# Patient Record
Sex: FEMALE | Race: WHITE | HISPANIC OR LATINO
[De-identification: names, ages, dates, MRNs, and addresses within clinical notes are randomized per-mention and may not be internally consistent; named-entity substitution may affect disease eponyms.]

---

## 2017-04-03 ENCOUNTER — APPOINTMENT (OUTPATIENT)
Dept: INTERNAL MEDICINE | Facility: CLINIC | Age: 32
End: 2017-04-03

## 2017-04-03 VITALS — DIASTOLIC BLOOD PRESSURE: 68 MMHG | SYSTOLIC BLOOD PRESSURE: 100 MMHG | RESPIRATION RATE: 12 BRPM | HEART RATE: 60 BPM

## 2017-04-03 VITALS — HEIGHT: 62 IN | BODY MASS INDEX: 38.83 KG/M2 | WEIGHT: 211 LBS

## 2017-04-03 DIAGNOSIS — M75.02 ADHESIVE CAPSULITIS OF LEFT SHOULDER: ICD-10-CM

## 2017-04-21 ENCOUNTER — TRANSCRIPTION ENCOUNTER (OUTPATIENT)
Age: 32
End: 2017-04-21

## 2017-06-16 ENCOUNTER — RX RENEWAL (OUTPATIENT)
Age: 32
End: 2017-06-16

## 2017-06-21 ENCOUNTER — RX RENEWAL (OUTPATIENT)
Age: 32
End: 2017-06-21

## 2017-07-01 ENCOUNTER — TRANSCRIPTION ENCOUNTER (OUTPATIENT)
Age: 32
End: 2017-07-01

## 2017-10-06 ENCOUNTER — TRANSCRIPTION ENCOUNTER (OUTPATIENT)
Age: 32
End: 2017-10-06

## 2018-04-24 ENCOUNTER — TRANSCRIPTION ENCOUNTER (OUTPATIENT)
Age: 33
End: 2018-04-24

## 2018-07-16 ENCOUNTER — APPOINTMENT (OUTPATIENT)
Dept: INTERNAL MEDICINE | Facility: CLINIC | Age: 33
End: 2018-07-16

## 2019-05-24 ENCOUNTER — EMERGENCY (EMERGENCY)
Facility: HOSPITAL | Age: 34
LOS: 1 days | Discharge: HOME | End: 2019-05-24
Attending: EMERGENCY MEDICINE
Payer: COMMERCIAL

## 2019-05-24 VITALS
HEIGHT: 63 IN | WEIGHT: 220.02 LBS | DIASTOLIC BLOOD PRESSURE: 98 MMHG | SYSTOLIC BLOOD PRESSURE: 164 MMHG | RESPIRATION RATE: 18 BRPM | OXYGEN SATURATION: 98 % | TEMPERATURE: 99 F | HEART RATE: 74 BPM

## 2019-05-24 VITALS
TEMPERATURE: 98 F | DIASTOLIC BLOOD PRESSURE: 78 MMHG | HEART RATE: 72 BPM | SYSTOLIC BLOOD PRESSURE: 141 MMHG | RESPIRATION RATE: 18 BRPM | OXYGEN SATURATION: 98 %

## 2019-05-24 LAB
ALBUMIN SERPL ELPH-MCNC: 4.7 G/DL — SIGNIFICANT CHANGE UP (ref 3.3–5.2)
ALP SERPL-CCNC: 203 U/L — HIGH (ref 40–120)
ALT FLD-CCNC: 146 U/L — HIGH
ANION GAP SERPL CALC-SCNC: 16 MMOL/L — SIGNIFICANT CHANGE UP (ref 5–17)
AST SERPL-CCNC: 228 U/L — HIGH
BILIRUB SERPL-MCNC: 1.3 MG/DL — SIGNIFICANT CHANGE UP (ref 0.4–2)
BUN SERPL-MCNC: 7 MG/DL — LOW (ref 8–20)
CALCIUM SERPL-MCNC: 10.3 MG/DL — HIGH (ref 8.6–10.2)
CHLORIDE SERPL-SCNC: 91 MMOL/L — LOW (ref 98–107)
CO2 SERPL-SCNC: 29 MMOL/L — SIGNIFICANT CHANGE UP (ref 22–29)
CREAT SERPL-MCNC: 0.49 MG/DL — LOW (ref 0.5–1.3)
D DIMER BLD IA.RAPID-MCNC: 336 NG/ML DDU — HIGH
ETHANOL SERPL-MCNC: <10 MG/DL — SIGNIFICANT CHANGE UP
GLUCOSE SERPL-MCNC: 104 MG/DL — SIGNIFICANT CHANGE UP (ref 70–115)
HCG SERPL-ACNC: <4 MIU/ML — SIGNIFICANT CHANGE UP
HCT VFR BLD CALC: 42.2 % — SIGNIFICANT CHANGE UP (ref 37–47)
HGB BLD-MCNC: 14.6 G/DL — SIGNIFICANT CHANGE UP (ref 12–16)
MAGNESIUM SERPL-MCNC: 2.1 MG/DL — SIGNIFICANT CHANGE UP (ref 1.6–2.6)
MCHC RBC-ENTMCNC: 31 PG — SIGNIFICANT CHANGE UP (ref 27–31)
MCHC RBC-ENTMCNC: 34.6 G/DL — SIGNIFICANT CHANGE UP (ref 32–36)
MCV RBC AUTO: 89.6 FL — SIGNIFICANT CHANGE UP (ref 81–99)
PLATELET # BLD AUTO: 138 K/UL — LOW (ref 150–400)
POTASSIUM SERPL-MCNC: 3 MMOL/L — LOW (ref 3.5–5.3)
POTASSIUM SERPL-SCNC: 3 MMOL/L — LOW (ref 3.5–5.3)
PROT SERPL-MCNC: 7.8 G/DL — SIGNIFICANT CHANGE UP (ref 6.6–8.7)
RBC # BLD: 4.71 M/UL — SIGNIFICANT CHANGE UP (ref 4.4–5.2)
RBC # FLD: 15.3 % — SIGNIFICANT CHANGE UP (ref 11–15.6)
SODIUM SERPL-SCNC: 136 MMOL/L — SIGNIFICANT CHANGE UP (ref 135–145)
TROPONIN T SERPL-MCNC: <0.01 NG/ML — SIGNIFICANT CHANGE UP (ref 0–0.06)
WBC # BLD: 4.1 K/UL — LOW (ref 4.8–10.8)
WBC # FLD AUTO: 4.1 K/UL — LOW (ref 4.8–10.8)

## 2019-05-24 PROCEDURE — 93005 ELECTROCARDIOGRAM TRACING: CPT

## 2019-05-24 PROCEDURE — 80053 COMPREHEN METABOLIC PANEL: CPT

## 2019-05-24 PROCEDURE — 36415 COLL VENOUS BLD VENIPUNCTURE: CPT

## 2019-05-24 PROCEDURE — 71275 CT ANGIOGRAPHY CHEST: CPT | Mod: 26

## 2019-05-24 PROCEDURE — 99285 EMERGENCY DEPT VISIT HI MDM: CPT

## 2019-05-24 PROCEDURE — 85379 FIBRIN DEGRADATION QUANT: CPT

## 2019-05-24 PROCEDURE — 84484 ASSAY OF TROPONIN QUANT: CPT

## 2019-05-24 PROCEDURE — 84702 CHORIONIC GONADOTROPIN TEST: CPT

## 2019-05-24 PROCEDURE — 71046 X-RAY EXAM CHEST 2 VIEWS: CPT

## 2019-05-24 PROCEDURE — 85027 COMPLETE CBC AUTOMATED: CPT

## 2019-05-24 PROCEDURE — 83735 ASSAY OF MAGNESIUM: CPT

## 2019-05-24 PROCEDURE — 80307 DRUG TEST PRSMV CHEM ANLYZR: CPT

## 2019-05-24 PROCEDURE — 93010 ELECTROCARDIOGRAM REPORT: CPT

## 2019-05-24 PROCEDURE — 99284 EMERGENCY DEPT VISIT MOD MDM: CPT

## 2019-05-24 PROCEDURE — 71275 CT ANGIOGRAPHY CHEST: CPT

## 2019-05-24 PROCEDURE — 71046 X-RAY EXAM CHEST 2 VIEWS: CPT | Mod: 26

## 2019-05-24 RX ORDER — SODIUM CHLORIDE 9 MG/ML
1000 INJECTION INTRAMUSCULAR; INTRAVENOUS; SUBCUTANEOUS ONCE
Refills: 0 | Status: COMPLETED | OUTPATIENT
Start: 2019-05-24 | End: 2019-05-24

## 2019-05-24 RX ORDER — POTASSIUM CHLORIDE 20 MEQ
1 PACKET (EA) ORAL
Qty: 6 | Refills: 0
Start: 2019-05-24 | End: 2019-05-26

## 2019-05-24 RX ORDER — POTASSIUM CHLORIDE 20 MEQ
40 PACKET (EA) ORAL ONCE
Refills: 0 | Status: COMPLETED | OUTPATIENT
Start: 2019-05-24 | End: 2019-05-24

## 2019-05-24 RX ADMIN — Medication 40 MILLIEQUIVALENT(S): at 22:32

## 2019-05-24 RX ADMIN — Medication 1 MILLIGRAM(S): at 14:11

## 2019-05-24 RX ADMIN — SODIUM CHLORIDE 1000 MILLILITER(S): 9 INJECTION INTRAMUSCULAR; INTRAVENOUS; SUBCUTANEOUS at 14:12

## 2019-05-24 NOTE — ED PROVIDER NOTE - CLINICAL SUMMARY MEDICAL DECISION MAKING FREE TEXT BOX
chest burning, sob, mild tachycardia with palpitations. no other PE risk factors, low risk but given tachycardia, dimer, eleavted, so will do cta. otherwise well. no tremors, no sign etoh withdrawal. pt with anxiety and stressors (no si/hi). hydration, ativan, cta, reassess.

## 2019-05-24 NOTE — ED PROVIDER NOTE - CARE PLAN
Principal Discharge DX:	Palpitations Principal Discharge DX:	Palpitations  Secondary Diagnosis:	Abnormal LFTs (liver function tests) Principal Discharge DX:	Palpitations  Secondary Diagnosis:	Abnormal LFTs (liver function tests)  Secondary Diagnosis:	Hypokalemia

## 2019-05-24 NOTE — ED PROVIDER NOTE - PROGRESS NOTE DETAILS
CTA results as noted.  Pt stable for d/c CTA results as noted.  Pt advised of abnormal labs including hypokalemia and LFT and instructed to f/u PMD as outpt.  Rx K-dur.  Pt is stable for d/c

## 2019-05-24 NOTE — ED ADULT TRIAGE NOTE - CHIEF COMPLAINT QUOTE
Patient arrived via EMS, awake alert, and oriented times 3, breathing unlabored.  Patient complaining of chest palpitations, and chest tightness which has been present intermittent for 3 days.  patient denies any drugs or alcohol., however EMS stated mother states drinks every day and has no drank in a few days

## 2019-05-24 NOTE — ED PROVIDER NOTE - OBJECTIVE STATEMENT
35 y/o female no pmh c/o 3 days of chest burning with palpitations. States feels anxious and has been under more stress with mother dying this year. States occasional etoh, not daily, no withdrawals and denies significant, none recently per pt, no other drugs. Denies hx dvt/pe, no leg pain/swelling, no immobilization, no known cardiac hx in family. No radiation or exertional component. Minimal sob    ROS: No fever/chills. No eye pain/changes in vision, No ear pain/sore throat/dysphagia, No   No abdominal pain, N/V/D, no black/bloody bm. No dysuria/frequency/discharge, No headache. No Dizziness.    No rashes or breaks in skin. No numbness/tingling/weakness.

## 2019-05-24 NOTE — ED ADULT NURSE NOTE - OBJECTIVE STATEMENT
Patient presents to ER complaining of substernal chest pain, denies N/V, reports symptoms X 3 days, patient anxious, resp even/unlabored, lungs CTAB.

## 2019-05-24 NOTE — ED ADULT NURSE NOTE - NSIMPLEMENTINTERV_GEN_ALL_ED
Implemented All Universal Safety Interventions:  [place] to call system. Call bell, personal items and telephone within reach. Instruct patient to call for assistance. Room bathroom lighting operational. Non-slip footwear when patient is off stretcher. Physically safe environment: no spills, clutter or unnecessary equipment. Stretcher in lowest position, wheels locked, appropriate side rails in place.

## 2019-05-24 NOTE — ED PROVIDER NOTE - PHYSICAL EXAMINATION
Gen: No acute distress, non toxic  HEENT: Mucous membranes moist, pink conjunctivae, EOMI  CV: pulse ~100-110, nl s1/s2.  Resp: CTAB, normal rate and effort  GI: Abdomen soft, NT, ND. No rebound, no guarding  : No CVAT  Neuro: A&O x 3, moving all 4 extremities  MSK: No spine or joint tenderness to palpation  Skin: No rashes. intact and perfused.

## 2019-05-25 ENCOUNTER — EMERGENCY (EMERGENCY)
Facility: HOSPITAL | Age: 34
LOS: 1 days | Discharge: HOME | End: 2019-05-25
Attending: EMERGENCY MEDICINE
Payer: COMMERCIAL

## 2019-05-25 VITALS
WEIGHT: 210.1 LBS | HEIGHT: 64 IN | SYSTOLIC BLOOD PRESSURE: 163 MMHG | HEART RATE: 90 BPM | TEMPERATURE: 99 F | OXYGEN SATURATION: 99 % | DIASTOLIC BLOOD PRESSURE: 103 MMHG | RESPIRATION RATE: 20 BRPM

## 2019-05-25 VITALS
HEART RATE: 112 BPM | HEIGHT: 63 IN | OXYGEN SATURATION: 98 % | DIASTOLIC BLOOD PRESSURE: 98 MMHG | SYSTOLIC BLOOD PRESSURE: 157 MMHG | TEMPERATURE: 98 F | RESPIRATION RATE: 20 BRPM | WEIGHT: 220.02 LBS

## 2019-05-25 VITALS
DIASTOLIC BLOOD PRESSURE: 104 MMHG | HEART RATE: 110 BPM | TEMPERATURE: 99 F | RESPIRATION RATE: 16 BRPM | SYSTOLIC BLOOD PRESSURE: 137 MMHG | OXYGEN SATURATION: 100 %

## 2019-05-25 DIAGNOSIS — F10.230 ALCOHOL DEPENDENCE WITH WITHDRAWAL, UNCOMPLICATED: ICD-10-CM

## 2019-05-25 DIAGNOSIS — F43.22 ADJUSTMENT DISORDER WITH ANXIETY: ICD-10-CM

## 2019-05-25 DIAGNOSIS — R69 ILLNESS, UNSPECIFIED: ICD-10-CM

## 2019-05-25 LAB
AMPHET UR-MCNC: NEGATIVE — SIGNIFICANT CHANGE UP
APPEARANCE UR: CLEAR — SIGNIFICANT CHANGE UP
BARBITURATES UR SCN-MCNC: NEGATIVE — SIGNIFICANT CHANGE UP
BENZODIAZ UR-MCNC: NEGATIVE — SIGNIFICANT CHANGE UP
BILIRUB UR-MCNC: NEGATIVE — SIGNIFICANT CHANGE UP
COCAINE METAB.OTHER UR-MCNC: NEGATIVE — SIGNIFICANT CHANGE UP
COLOR SPEC: YELLOW — SIGNIFICANT CHANGE UP
DIFF PNL FLD: ABNORMAL
EPI CELLS # UR: SIGNIFICANT CHANGE UP
GLUCOSE UR QL: NEGATIVE MG/DL — SIGNIFICANT CHANGE UP
HCG UR QL: NEGATIVE — SIGNIFICANT CHANGE UP
KETONES UR-MCNC: NEGATIVE — SIGNIFICANT CHANGE UP
LEUKOCYTE ESTERASE UR-ACNC: NEGATIVE — SIGNIFICANT CHANGE UP
METHADONE UR-MCNC: NEGATIVE — SIGNIFICANT CHANGE UP
NITRITE UR-MCNC: NEGATIVE — SIGNIFICANT CHANGE UP
OPIATES UR-MCNC: NEGATIVE — SIGNIFICANT CHANGE UP
PCP SPEC-MCNC: SIGNIFICANT CHANGE UP
PCP UR-MCNC: NEGATIVE — SIGNIFICANT CHANGE UP
PH UR: 7 — SIGNIFICANT CHANGE UP (ref 5–8)
PROT UR-MCNC: NEGATIVE MG/DL — SIGNIFICANT CHANGE UP
RBC CASTS # UR COMP ASSIST: SIGNIFICANT CHANGE UP /HPF (ref 0–4)
SP GR SPEC: 1 — LOW (ref 1.01–1.02)
THC UR QL: NEGATIVE — SIGNIFICANT CHANGE UP
UROBILINOGEN FLD QL: NEGATIVE MG/DL — SIGNIFICANT CHANGE UP
WBC UR QL: NEGATIVE — SIGNIFICANT CHANGE UP

## 2019-05-25 PROCEDURE — 99284 EMERGENCY DEPT VISIT MOD MDM: CPT

## 2019-05-25 PROCEDURE — 80307 DRUG TEST PRSMV CHEM ANLYZR: CPT

## 2019-05-25 PROCEDURE — 99282 EMERGENCY DEPT VISIT SF MDM: CPT

## 2019-05-25 PROCEDURE — 90791 PSYCH DIAGNOSTIC EVALUATION: CPT

## 2019-05-25 PROCEDURE — 81001 URINALYSIS AUTO W/SCOPE: CPT

## 2019-05-25 PROCEDURE — 99283 EMERGENCY DEPT VISIT LOW MDM: CPT

## 2019-05-25 PROCEDURE — 81025 URINE PREGNANCY TEST: CPT

## 2019-05-25 RX ORDER — MECLIZINE HCL 12.5 MG
1 TABLET ORAL
Qty: 15 | Refills: 0
Start: 2019-05-25

## 2019-05-25 RX ADMIN — Medication 50 MILLIGRAM(S): at 19:06

## 2019-05-25 RX ADMIN — Medication 50 MILLIGRAM(S): at 23:39

## 2019-05-25 NOTE — ED ADULT NURSE NOTE - ORIENTED TO SITUATION
Pt asking to speak with robin Rincon stgino Gifford is only available on Tuesday and Thursday.          Yes

## 2019-05-25 NOTE — ED PROVIDER NOTE - PROGRESS NOTE DETAILS
Pt. seen and cleared by psych. Pt. in no distress. NO tremors. NO confusion. Normal and steady gait. Pt. also given outpatient referral. Pt. slightly hypertensive and will discharged home on librium to prevent DTs and or seizures.

## 2019-05-25 NOTE — ED ADULT NURSE REASSESSMENT NOTE - NS ED NURSE REASSESS COMMENT FT1
Upon return from rendering care to critical pt, pt was unable to be located, MD Zhou aware, psych previously cleared pt, pt had previously denied SI/HI and was acting calm and cooperative, pt did not wait for D/C instructions, follow up care, and papers

## 2019-05-25 NOTE — ED BEHAVIORAL HEALTH NOTE - BEHAVIORAL HEALTH NOTE
Miryam: pt seen by psych NP(Salvdaor) found to benefit from outpatient psychotherapy . [facility] explained agreed to attend. schedule reviewed, release form signed as per protocol. Appt given for Tuesday June4th at 16:30. Aware to call 911 and/or to go to the closest ED . No other concerns reported at this moment.

## 2019-05-25 NOTE — ED ADULT NURSE NOTE - OBJECTIVE STATEMENT
Pt brought in by family for visual and auditory halluciantions, pt went on 3 week drinking binge after death of her mother, last drink 2 days ago, denies SI/HI

## 2019-05-25 NOTE — ED STATDOCS - PHYSICAL EXAMINATION
Gen: No acute distress, non toxic  HEENT: Mucous membranes moist, pink conjunctivae, EOMI. TM's within normal limits.   CV: RRR  Resp: CTAB, normal rate and effort  GI: Abdomen soft, NT, ND. No rebound, no guarding  Neuro: A&O x 3, moving all 4 extremities, normal sensation and strength to UE PATY.

## 2019-05-25 NOTE — ED BEHAVIORAL HEALTH ASSESSMENT NOTE - DETAILS
7 y/o. Spoke with cousin (Le) about the plan. Spoke to Le about the plan Dr. Donato Spoke to Patient' cousin about the plan.

## 2019-05-25 NOTE — ED BEHAVIORAL HEALTH ASSESSMENT NOTE - OTHER
Patient advised to go to AA meetings Grieving; mother  last month. Will benefit from outpatient psychiatric referral for therapy. Psychotropic meds are not indicated at this time.

## 2019-05-25 NOTE — ED BEHAVIORAL HEALTH ASSESSMENT NOTE - HPI (INCLUDE ILLNESS QUALITY, SEVERITY, DURATION, TIMING, CONTEXT, MODIFYING FACTORS, ASSOCIATED SIGNS AND SYMPTOMS)
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 Without any past medical history; without any past psychiatric history; without any past psychiatric hospitalizations; with a history of alcohol abuse since age 20.  With one prior arrest for a DWI charge in 2016. No history of illicit drug use reported. Psychiatry was consulted per family' request.    Met with patient in ED for a psychiatric assessment. Patient is calm, cooperative and pleasant. States "my family got scared because I killed a rat, and they couldn't see the rat; my family is overprotective". Patient states her mother   on 19, and that she has been drinking on and off since mother ; however, for the past week she reports drinking one bottle of wine daily. She states she has stopped drinking 3 days ago.  No current tremors noted or reported. She received librium 50mg PO X1 dose in the ED and reported feeling better. She denies any history of illicit drug use. She denies any history of childhood trauma, but was adopted as a child, noting her mother went to detention for over 10 years when she was just 5 years old. She denies any history of psychiatric treatment or psychiatric hospitalizations; denies any history of suicidal attempts, and denies current suicidal ideations, suicidal intent or suicidal plans, citing her daughter as protective factor. She denies any history of psychosis, juliane and no current symptoms of delusions, paranoia, hallucinations reported or noted. She states she only had one episode of visual hallucination today by seeing a rat. However, she denies current hallucinations, delusions, paranoia, ideas of reference and none noted. She states she is now more accepting of her mother' death, noting "I have to accept that she  since I can't change what happened". Patient states she lives with her cousin Le and her aunt in . She states they are very supportive of her. She states her sleep is "on and off", and that she wakes up early to get ready for work. She states she works from Monday to Friday from 8:30AM until 5:01PM. She states she likes her job and talks to different people each day. 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 She states she plans on taking the test in the future.     Per collateral from her cousin Le who requested for psych eval, she states she was concerned because her cousin told her she was seeing a rat when there was no rat there. She states she was concerned because her cousin had been drinking a lot then stopped on Wednesday. 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 Without any past medical history; without any past psychiatric history; without any past psychiatric hospitalizations; with a history of alcohol abuse since age 20.  With one prior arrest for a DWI charge in 2016. No history of illicit drug use reported. Of note patient was see in the ED yesterday 19 for palpitation and buzzing in her ear. She was treated and released and returned today with symptoms consistent with alcohol withdrawals. Psychiatry was consulted per family' request.     Met with patient in ED for a psychiatric assessment. Patient is calm, cooperative and pleasant. States "my family got scared because I killed a rat, and they couldn't see the rat; my family is overprotective". Patient states her mother   on 19, and that she has been drinking on and off since mother ; however, for the past week she reports drinking one bottle of wine daily. She states she has stopped drinking 3 days ago.  No current tremors noted or reported. She received librium 50mg PO X1 dose in the ED and reported feeling better. She denies any history of illicit drug use. She denies any history of childhood trauma, but was adopted as a child, noting her mother went to group home for over 10 years when she was just 5 years old. She denies any history of psychiatric treatment or psychiatric hospitalizations; denies any history of suicidal attempts, and denies current suicidal ideations, suicidal intent or suicidal plans, citing her daughter as protective factor. She denies any history of psychosis, juliane and no current symptoms of delusions, paranoia, hallucinations reported or noted. She states she only had one episode of visual hallucination today by seeing a rat. However, she denies current hallucinations, delusions, paranoia, ideas of reference and none noted. She states she is now more accepting of her mother' death, noting "I have to accept that she  since I can't change what happened". Patient states she lives with her cousin Le and her aunt in . She states they are very supportive of her. She states her sleep is "on and off", and that she wakes up early to get ready for work. She states she works from Monday to Friday from 8:30AM until 5:01PM. She states she likes her job and talks to different people each day. 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 She states she plans on taking the test in the future.     Per collateral from her cousin Le who requested for psych eval, she states she was concerned because her cousin told her she was seeing a rat when there was no rat there. She states she was concerned because her cousin had been drinking a lot then stopped on Wednesday.

## 2019-05-25 NOTE — ED BEHAVIORAL HEALTH ASSESSMENT NOTE - RISK ASSESSMENT
LOW RISK:  Patient is at very low risk of harm to self or others given lack of current psychiatric symptomatology. LOW RISK:  Patient is at very low risk of harm to self or others given lack of current psychiatric symptomatology. She has no history of past suicidal attempts, psychosis, juliane and none acute psychiatric symptoms noted at this time.

## 2019-05-25 NOTE — ED BEHAVIORAL HEALTH ASSESSMENT NOTE - PSYCHIATRIC ISSUES AND PLAN (INCLUDE STANDING AND PRN MEDICATION)
Grieving; mother  last month. Will benefit from outpatient psychiatric referral for therapy. Psychotropic meds are not indicated at this time.

## 2019-05-25 NOTE — ED BEHAVIORAL HEALTH ASSESSMENT NOTE - SUICIDE PROTECTIVE FACTORS
Responsibility to family and others/High spirituality/Positive therapeutic relationships/Supportive social network or family/Engaged in work or school/Identifies reasons for living/Future oriented

## 2019-05-25 NOTE — ED BEHAVIORAL HEALTH ASSESSMENT NOTE - SUMMARY
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 Without any past medical history; without any past psychiatric history; without any past psychiatric hospitalizations; with a history of alcohol abuse since age 20.  With one prior arrest for a DWI charge in 2016. No history of illicit drug use reported. Psychiatry was consulted per family' request.      Patient is calm and cooperative; denying current acute psychiatric symptomatology and none noted. She was offered PRN medication for anxiety; however, she adamantly refused medication, noting she does not like to take medications. Nonetheless, she is calm, pleasant, well-related throughout this assessment; no signs of perceptual disturbances noted or reported. She is not an acute risk to self or others and will not benefit from impatient psychiatric treatment; however, she was given an outpatient psychiatric referral for [facility] for 6/4/19 at 4:30pm.   Case discussed with Dr. 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 Without any past medical history; without any past psychiatric history; without any past psychiatric hospitalizations; with a history of alcohol abuse since age 20.  With one prior arrest for a DWI charge in 2016. No history of illicit drug use reported. Psychiatry was consulted per family' request.      Patient is calm and cooperative; denying current acute psychiatric symptomatology and none noted. She was offered PRN medication for anxiety; however, she adamantly refused medication, noting she does not like to take medications. Nonetheless, she is calm, pleasant, well-related throughout this assessment; no signs of perceptual disturbances noted or reported. She is not an acute risk to self or others and will not benefit from impatient psychiatric treatment; however, she has current signs of alcohol withdrawals including an elevated BP, and will benefit from staying overnight for management of alcohol withdrawals. She was seen by social work and is scheduled for an outpatient psychiatric referral for [facility] for 6/4/19 at 4:30pm.   Case discussed with Dr. 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 Without any past medical history; without any past psychiatric history; without any past psychiatric hospitalizations; with a history of alcohol abuse since age 20.  With one prior arrest for a DWI charge in 2016. No history of illicit drug use reported. Psychiatry was consulted per family' request.      Patient is calm and cooperative; denying current acute psychiatric symptomatology and none noted. She was offered PRN medication for anxiety to take at home; however, she adamantly refused medication, noting she does not like to take medications. However, she  did receive Librium 50 mg X dose while in the ED. Nonetheless, she is calm, pleasant, well-related throughout this assessment; no signs of perceptual disturbances noted or reported. She is not an acute risk to self or others and will not benefit from impatient psychiatric treatment; however, she has current signs of alcohol withdrawals including an elevated BP and current anxiety, and will benefit from staying overnight for management of alcohol withdrawals. She was seen by social work and is scheduled for an outpatient psychiatric referral for [facility] for 6/4/19 at 4:30pm.   Case discussed with Dr. Donato who recommended overnight hold for reassessment in AM; however, patient adamantly refused to stay overnight, but agrees to take Librium 25 mg PO TID as prescribed by Dr. Zhou. Librium sent to patient' pharmacy by Dr. Zhou.

## 2019-05-25 NOTE — ED BEHAVIORAL HEALTH ASSESSMENT NOTE - DESCRIPTION
None See HPI Calm and pleasant; without acute mood dysregulations and none noted.     Vital Signs Last 24 Hrs  T(C): 37.2 (25 May 2019 19:10), Max: 37.2 (25 May 2019 19:10)  T(F): 99 (25 May 2019 19:10), Max: 99 (25 May 2019 19:10)  HR: 100 (25 May 2019 19:10) (90 - 112)  BP: 152/104 (25 May 2019 19:10) (152/104 - 163/103)  BP(mean): --  RR: 20 (25 May 2019 19:10) (20 - 20)  SpO2: 97% (25 May 2019 19:10) (97% - 99%)

## 2019-05-25 NOTE — ED PROVIDER NOTE - OBJECTIVE STATEMENT
Pt. present to ED with cousin. Pt's mother passed away 3 weeks ago and pt. has been drinking daily for the past 2 weeks. Last drink was 3 days ago. As per cousin, pt. was very aggressive and agitated. Pt. also was hallucinating as per cousin.. Pt. believed that there was a rat at the house today and she was trying to kill the rat. Pt. was seen in the ED yesterday for palpitations and buzzing in her ear this morning. Pt. admits to being sad but denies any SI/HI.

## 2019-05-25 NOTE — ED STATDOCS - OBJECTIVE STATEMENT
33 y/o F pt with no pert. hx presents to the ED c/o intermittent tinnitus for a couple of weeks, with assoc. dizziness, and weakness; worsening this AM. Pt also notes some areas of R arm numbness. Pt reports she was seen in ED last night for chest palpitations, and after she left the ED she heard a "buzzing" noise. Pt admits that she has been anxious and under stress recently. Denies chest pain, abdominal pain, f/c, nausea, vomiting, and HA. No further complaints at this time.

## 2019-05-25 NOTE — ED BEHAVIORAL HEALTH ASSESSMENT NOTE - MEDICATIONS (PRESCRIPTIONS, DIRECTIONS)
Received Librium 50 mg in ED; refused PRN anti-anxiety med offered for anxiety. Librium 25 mg PO TID for alcohol withdrawals. Medication sent to patient' pharmacy by Dr. Zhou.

## 2019-05-25 NOTE — ED ADULT NURSE REASSESSMENT NOTE - NS ED NURSE REASSESS COMMENT FT1
Assuming care from previous RN, pt A&O x's 4, resp even and unlabored, color good, offers no complaints, pt calm and cooperative, awaiting psych and dispo, will continue to monitor

## 2019-05-25 NOTE — ED BEHAVIORAL HEALTH ASSESSMENT NOTE - REFERRAL / APPOINTMENT DETAILS
[facility]: Intake appointment on 6/4/19. 6/4/19 at 4:30PM with the [facility] for an initial outpatient psychiatric appointment.

## 2019-05-25 NOTE — ED ADULT NURSE REASSESSMENT NOTE - NS ED NURSE REASSESS COMMENT FT1
Pt returned to ED, VSS, pt medicated as per MD orders, pt rec'd D/C instructions and aware of Rx at pharmacy

## 2019-05-25 NOTE — ED STATDOCS - NS ED ROS FT
ROS: (+) dizziness, tinnitus, (-) No fever/chills.  No chest painNo SOB/cough/. No abdominal pain, N/V/D,No dysuria/frequency.  No headache. No Dizziness.    No rashes  No numbness/weakness

## 2019-05-25 NOTE — ED ADULT TRIAGE NOTE - CHIEF COMPLAINT QUOTE
Patient presents to ER complaining of anxiety, as per EMS patient lost family member recently and has not been acting normal since, patient is A&O X 3, denies SI/HI, resp even/unlabored, calm and cooperative.

## 2019-05-25 NOTE — ED BEHAVIORAL HEALTH ASSESSMENT NOTE - NS ED BHA PLAN TR BH CONTACTED FT
No current outpatient psychiatric provider reported or noted. No current outpatient psychiatry reported.

## 2019-05-25 NOTE — ED STATDOCS - CLINICAL SUMMARY MEDICAL DECISION MAKING FREE TEXT BOX
Pt under stress, with multiple complaints, likely other form with ringing in ear, mild dizziness, will prescribe Meclizine for dizziness, refer to [facility] clinic, no red flags. Pt under stress, with multiple complaints, tinnitus, mild dizziness, will prescribe Meclizine for dizziness, neuro intact and very well appearing. chest symptoms resolved.  refer to [facility] clinic, no red flags.

## 2019-05-25 NOTE — ED ADULT TRIAGE NOTE - CHIEF COMPLAINT QUOTE
Pt ambulatory in ED c/o multiple medical complaints, reports she was seen in ED yesterday for chest pain and discharged home with negative workup. Today pt c/o "burning and buzzing to the back of my head" also c/o b/l numbness to shoulders. Denies chest pain or sob. Pt appearing to be anxious in triage. Redirected, calm and cooperative.

## 2019-06-01 ENCOUNTER — OUTPATIENT (OUTPATIENT)
Dept: OUTPATIENT SERVICES | Facility: HOSPITAL | Age: 34
LOS: 1 days | End: 2019-06-01
Payer: MEDICAID

## 2019-06-01 PROCEDURE — G9001: CPT

## 2019-06-08 ENCOUNTER — TRANSCRIPTION ENCOUNTER (OUTPATIENT)
Age: 34
End: 2019-06-08

## 2019-06-10 DIAGNOSIS — Z71.89 OTHER SPECIFIED COUNSELING: ICD-10-CM

## 2019-06-12 ENCOUNTER — APPOINTMENT (OUTPATIENT)
Dept: INTERNAL MEDICINE | Facility: CLINIC | Age: 34
End: 2019-06-12
Payer: MEDICAID

## 2019-06-12 VITALS
RESPIRATION RATE: 14 BRPM | SYSTOLIC BLOOD PRESSURE: 118 MMHG | HEIGHT: 62 IN | BODY MASS INDEX: 43.24 KG/M2 | HEART RATE: 72 BPM | DIASTOLIC BLOOD PRESSURE: 76 MMHG | WEIGHT: 235 LBS

## 2019-06-12 DIAGNOSIS — L30.9 DERMATITIS, UNSPECIFIED: ICD-10-CM

## 2019-06-12 DIAGNOSIS — F41.9 ANXIETY DISORDER, UNSPECIFIED: ICD-10-CM

## 2019-06-12 PROCEDURE — 99214 OFFICE O/P EST MOD 30 MIN: CPT

## 2019-06-12 RX ORDER — METHYLPREDNISOLONE 4 MG/1
4 TABLET ORAL
Qty: 1 | Refills: 0 | Status: DISCONTINUED | COMMUNITY
Start: 2017-04-03 | End: 2019-06-12

## 2019-06-12 NOTE — ASSESSMENT
[FreeTextEntry1] : start paxil\par klonipin\par short follow up to ensure meds working\par going to [facility] for couseling\par call if issues wth meds\par cream exzema\par follow up prn

## 2019-06-12 NOTE — HISTORY OF PRESENT ILLNESS
[FreeTextEntry1] : patient here for acute visit [de-identified] : patient has been going through and emotional time\par her mother passed away and she lost her job\par but now has her job\par was emotional mess for a time but right now is feeling very anxious\par with trouble sleeping\par no chest pain sob,, is teary eyed thinking about her mom

## 2019-06-12 NOTE — PHYSICAL EXAM
[No Acute Distress] : no acute distress [Well Nourished] : well nourished [Well Developed] : well developed [Well-Appearing] : well-appearing [Normal Sclera/Conjunctiva] : normal sclera/conjunctiva [PERRL] : pupils equal round and reactive to light [EOMI] : extraocular movements intact [Normal Oropharynx] : the oropharynx was normal [Normal Outer Ear/Nose] : the outer ears and nose were normal in appearance [No Lymphadenopathy] : no lymphadenopathy [Supple] : supple [No JVD] : no jugular venous distention [No Respiratory Distress] : no respiratory distress  [Thyroid Normal, No Nodules] : the thyroid was normal and there were no nodules present [Clear to Auscultation] : lungs were clear to auscultation bilaterally [Normal Rate] : normal rate  [No Accessory Muscle Use] : no accessory muscle use [Normal S1, S2] : normal S1 and S2 [Regular Rhythm] : with a regular rhythm [No Murmur] : no murmur heard [No Carotid Bruits] : no carotid bruits [No Abdominal Bruit] : a ~M bruit was not heard ~T in the abdomen [No Varicosities] : no varicosities [Pedal Pulses Present] : the pedal pulses are present [No Extremity Clubbing/Cyanosis] : no extremity clubbing/cyanosis [No Edema] : there was no peripheral edema [No Palpable Aorta] : no palpable aorta [Non Tender] : non-tender [Soft] : abdomen soft [No Masses] : no abdominal mass palpated [No HSM] : no HSM [Non-distended] : non-distended [Normal Posterior Cervical Nodes] : no posterior cervical lymphadenopathy [Normal Bowel Sounds] : normal bowel sounds [Normal Anterior Cervical Nodes] : no anterior cervical lymphadenopathy [No CVA Tenderness] : no CVA  tenderness [No Spinal Tenderness] : no spinal tenderness [No Joint Swelling] : no joint swelling [Grossly Normal Strength/Tone] : grossly normal strength/tone [Coordination Grossly Intact] : coordination grossly intact [Normal Gait] : normal gait [No Rash] : no rash [No Focal Deficits] : no focal deficits [Deep Tendon Reflexes (DTR)] : deep tendon reflexes were 2+ and symmetric [Normal Affect] : the affect was normal [Normal Insight/Judgement] : insight and judgment were intact

## 2019-06-12 NOTE — HEALTH RISK ASSESSMENT
[] : No [2] : 1) Little interest or pleasure doing things for more than half of the days (2) [1] : 2) Feeling down, depressed, or hopeless for several days (1) [de-identified] : h

## 2019-07-10 ENCOUNTER — APPOINTMENT (OUTPATIENT)
Dept: INTERNAL MEDICINE | Facility: CLINIC | Age: 34
End: 2019-07-10
Payer: MEDICAID

## 2019-07-10 VITALS — WEIGHT: 234 LBS | BODY MASS INDEX: 43.06 KG/M2 | HEIGHT: 62 IN

## 2019-07-10 VITALS — HEART RATE: 70 BPM | SYSTOLIC BLOOD PRESSURE: 117 MMHG | DIASTOLIC BLOOD PRESSURE: 78 MMHG

## 2019-07-10 PROCEDURE — 99214 OFFICE O/P EST MOD 30 MIN: CPT

## 2019-07-10 NOTE — ASSESSMENT
[FreeTextEntry1] : increase paxil to 40\par klonopin to 1mg bid\par see therapist\par cpe next visit

## 2019-07-10 NOTE — PHYSICAL EXAM
[No Acute Distress] : no acute distress [Well Nourished] : well nourished [Well Developed] : well developed [Well-Appearing] : well-appearing [Normal Sclera/Conjunctiva] : normal sclera/conjunctiva [PERRL] : pupils equal round and reactive to light [EOMI] : extraocular movements intact [Normal Outer Ear/Nose] : the outer ears and nose were normal in appearance [Normal Oropharynx] : the oropharynx was normal [No JVD] : no jugular venous distention [No Lymphadenopathy] : no lymphadenopathy [Supple] : supple [Thyroid Normal, No Nodules] : the thyroid was normal and there were no nodules present [No Respiratory Distress] : no respiratory distress  [No Accessory Muscle Use] : no accessory muscle use [Clear to Auscultation] : lungs were clear to auscultation bilaterally [Normal Rate] : normal rate  [Regular Rhythm] : with a regular rhythm [Normal S1, S2] : normal S1 and S2 [No Carotid Bruits] : no carotid bruits [No Murmur] : no murmur heard [No Abdominal Bruit] : a ~M bruit was not heard ~T in the abdomen [No Varicosities] : no varicosities [Pedal Pulses Present] : the pedal pulses are present [No Edema] : there was no peripheral edema [No Palpable Aorta] : no palpable aorta [No Extremity Clubbing/Cyanosis] : no extremity clubbing/cyanosis [Soft] : abdomen soft [Non Tender] : non-tender [Non-distended] : non-distended [No HSM] : no HSM [No Masses] : no abdominal mass palpated [Normal Bowel Sounds] : normal bowel sounds [Normal Posterior Cervical Nodes] : no posterior cervical lymphadenopathy [Normal Anterior Cervical Nodes] : no anterior cervical lymphadenopathy [No CVA Tenderness] : no CVA  tenderness [No Spinal Tenderness] : no spinal tenderness [No Joint Swelling] : no joint swelling [Grossly Normal Strength/Tone] : grossly normal strength/tone [Coordination Grossly Intact] : coordination grossly intact [No Rash] : no rash [No Focal Deficits] : no focal deficits [Deep Tendon Reflexes (DTR)] : deep tendon reflexes were 2+ and symmetric [Normal Gait] : normal gait [Normal Insight/Judgement] : insight and judgment were intact [Normal Affect] : the affect was normal

## 2019-07-10 NOTE — HISTORY OF PRESENT ILLNESS
[de-identified] : still feels anxious\par but will be seeing therapist soon\par not suicidal\par patient has no chest pain sob nvd or palpitations\par has some trouble concentrating\par meds helping but not as much as she would like [FreeTextEntry1] : follow up anxiety

## 2019-07-26 ENCOUNTER — MEDICATION RENEWAL (OUTPATIENT)
Age: 34
End: 2019-07-26

## 2019-09-01 ENCOUNTER — OUTPATIENT (OUTPATIENT)
Dept: OUTPATIENT SERVICES | Facility: HOSPITAL | Age: 34
LOS: 1 days | End: 2019-09-01

## 2019-09-06 ENCOUNTER — MEDICATION RENEWAL (OUTPATIENT)
Age: 34
End: 2019-09-06

## 2019-09-08 ENCOUNTER — LABORATORY RESULT (OUTPATIENT)
Age: 34
End: 2019-09-08

## 2019-09-09 ENCOUNTER — APPOINTMENT (OUTPATIENT)
Dept: INTERNAL MEDICINE | Facility: CLINIC | Age: 34
End: 2019-09-09
Payer: MEDICAID

## 2019-09-09 ENCOUNTER — LABORATORY RESULT (OUTPATIENT)
Age: 34
End: 2019-09-09

## 2019-09-09 VITALS
WEIGHT: 240 LBS | DIASTOLIC BLOOD PRESSURE: 78 MMHG | BODY MASS INDEX: 44.16 KG/M2 | SYSTOLIC BLOOD PRESSURE: 114 MMHG | RESPIRATION RATE: 12 BRPM | HEART RATE: 68 BPM | HEIGHT: 62 IN

## 2019-09-09 DIAGNOSIS — F43.22 ADJUSTMENT DISORDER WITH ANXIETY: ICD-10-CM

## 2019-09-09 PROCEDURE — 99395 PREV VISIT EST AGE 18-39: CPT | Mod: 25

## 2019-09-09 PROCEDURE — 36415 COLL VENOUS BLD VENIPUNCTURE: CPT

## 2019-09-09 NOTE — PHYSICAL EXAM
[No Acute Distress] : no acute distress [Well Nourished] : well nourished [Well Developed] : well developed [Well-Appearing] : well-appearing [Normal Sclera/Conjunctiva] : normal sclera/conjunctiva [EOMI] : extraocular movements intact [PERRL] : pupils equal round and reactive to light [Normal Oropharynx] : the oropharynx was normal [Normal Outer Ear/Nose] : the outer ears and nose were normal in appearance [No JVD] : no jugular venous distention [No Lymphadenopathy] : no lymphadenopathy [Supple] : supple [Thyroid Normal, No Nodules] : the thyroid was normal and there were no nodules present [No Respiratory Distress] : no respiratory distress  [No Accessory Muscle Use] : no accessory muscle use [Clear to Auscultation] : lungs were clear to auscultation bilaterally [Normal Rate] : normal rate  [Regular Rhythm] : with a regular rhythm [Normal S1, S2] : normal S1 and S2 [No Murmur] : no murmur heard [No Carotid Bruits] : no carotid bruits [No Abdominal Bruit] : a ~M bruit was not heard ~T in the abdomen [No Varicosities] : no varicosities [Pedal Pulses Present] : the pedal pulses are present [No Edema] : there was no peripheral edema [No Palpable Aorta] : no palpable aorta [No Extremity Clubbing/Cyanosis] : no extremity clubbing/cyanosis [Soft] : abdomen soft [Non-distended] : non-distended [Non Tender] : non-tender [No HSM] : no HSM [No Masses] : no abdominal mass palpated [Normal Posterior Cervical Nodes] : no posterior cervical lymphadenopathy [Normal Bowel Sounds] : normal bowel sounds [Normal Anterior Cervical Nodes] : no anterior cervical lymphadenopathy [No CVA Tenderness] : no CVA  tenderness [No Spinal Tenderness] : no spinal tenderness [No Joint Swelling] : no joint swelling [Grossly Normal Strength/Tone] : grossly normal strength/tone [No Rash] : no rash [Coordination Grossly Intact] : coordination grossly intact [No Focal Deficits] : no focal deficits [Normal Gait] : normal gait [Deep Tendon Reflexes (DTR)] : deep tendon reflexes were 2+ and symmetric [Normal Affect] : the affect was normal [Normal Insight/Judgement] : insight and judgment were intact

## 2019-09-09 NOTE — COUNSELING
[Behavioral health counseling provided] : Behavioral health counseling provided [Potential consequences of obesity discussed] : Potential consequences of obesity discussed [Benefits of weight loss discussed] : Benefits of weight loss discussed [Structured Weight Management Program suggested:] : Structured weight management program suggested [Encouraged to maintain food diary] : Encouraged to maintain food diary [Encouraged to increase physical activity] : Encouraged to increase physical activity [Target Wt Loss Goal ___] : Weight Loss Goals: Target weight loss goal [unfilled] lbs

## 2019-09-09 NOTE — ASSESSMENT
[FreeTextEntry1] : see therapist for mental health services\par in a custody vill[place]\par check labs\par follow up prn \par up to date with tetanus vaccine\par flu vaccine when available not available right now

## 2019-09-09 NOTE — HEALTH RISK ASSESSMENT
[Good] : ~his/her~  mood as  good [No] : No [No falls in past year] : Patient reported no falls in the past year [HIV Test offered] : HIV Test offered [Hepatitis C test offered] : Hepatitis C test offered [None] : None [With Family] : lives with family [Employed] : employed [Sexually Active] : sexually active [College] : College [Feels Safe at Home] : Feels safe at home [Fully functional (bathing, dressing, toileting, transferring, walking, feeding)] : Fully functional (bathing, dressing, toileting, transferring, walking, feeding) [Fully functional (using the telephone, shopping, preparing meals, housekeeping, doing laundry, using] : Fully functional and needs no help or supervision to perform IADLs (using the telephone, shopping, preparing meals, housekeeping, doing laundry, using transportation, managing medications and managing finances) [Reports changes in dental health] : Reports changes in dental health [Smoke Detector] : smoke detector [Seat Belt] :  uses seat belt [Safety elements used in home] : safety elements used in home [] : No [Change in mental status noted] : No change in mental status noted [Language] : denies difficulty with language [Behavior] : denies difficulty with behavior [Learning/Retaining New Information] : denies difficulty learning/retaining new information [Handling Complex Tasks] : denies difficulty handling complex tasks [Reasoning] : denies difficulty with reasoning [High Risk Behavior] : no high risk behavior [Reports changes in hearing] : Reports no changes in hearing [Reports changes in vision] : Reports no changes in vision [Reports normal functional visual acuity (ie: able to read med bottle)] : Reports poor functional visual acuity.  [Carbon Monoxide Detector] : no carbon monoxide detector [Sunscreen] : does not use sunscreen [Travel to Developing Areas] : does not  travel to developing areas [AdvancecareDate] : 9/9/19 [PapSmearDate] : due

## 2019-09-09 NOTE — HISTORY OF PRESENT ILLNESS
[FreeTextEntry1] : For CPE [de-identified] : For CPE\par patient has been having issues with anxiety \par patient has no chest pain nvd or palpiatations. \par she will see a therapist

## 2019-09-10 ENCOUNTER — CHART COPY (OUTPATIENT)
Age: 34
End: 2019-09-10

## 2019-09-10 ENCOUNTER — CLINICAL ADVICE (OUTPATIENT)
Age: 34
End: 2019-09-10

## 2019-09-10 DIAGNOSIS — E55.9 VITAMIN D DEFICIENCY, UNSPECIFIED: ICD-10-CM

## 2019-09-10 LAB
25(OH)D3 SERPL-MCNC: 11.2 NG/ML
ALBUMIN SERPL ELPH-MCNC: 4.6 G/DL
ALP BLD-CCNC: 126 U/L
ALT SERPL-CCNC: 38 U/L
ANION GAP SERPL CALC-SCNC: 16 MMOL/L
APPEARANCE: ABNORMAL
AST SERPL-CCNC: 35 U/L
BASOPHILS # BLD AUTO: 0.05 K/UL
BASOPHILS NFR BLD AUTO: 0.6 %
BILIRUB SERPL-MCNC: 0.3 MG/DL
BILIRUBIN URINE: NEGATIVE
BLOOD URINE: ABNORMAL
BUN SERPL-MCNC: 16 MG/DL
CALCIUM SERPL-MCNC: 10.2 MG/DL
CHLORIDE SERPL-SCNC: 101 MMOL/L
CHOLEST SERPL-MCNC: 213 MG/DL
CHOLEST/HDLC SERPL: 2.3 RATIO
CO2 SERPL-SCNC: 24 MMOL/L
COLOR: ABNORMAL
CREAT SERPL-MCNC: 0.67 MG/DL
EOSINOPHIL # BLD AUTO: 0 K/UL
EOSINOPHIL NFR BLD AUTO: 0 %
ESTIMATED AVERAGE GLUCOSE: 94 MG/DL
GLUCOSE QUALITATIVE U: NEGATIVE
GLUCOSE SERPL-MCNC: 95 MG/DL
HBA1C MFR BLD HPLC: 4.9 %
HCT VFR BLD CALC: 44.5 %
HCV AB SER QL: NONREACTIVE
HCV S/CO RATIO: 0.13 S/CO
HDLC SERPL-MCNC: 91 MG/DL
HGB BLD-MCNC: 13.9 G/DL
IMM GRANULOCYTES NFR BLD AUTO: 0.3 %
KETONES URINE: NEGATIVE
LDLC SERPL CALC-MCNC: 104 MG/DL
LEUKOCYTE ESTERASE URINE: ABNORMAL
LYMPHOCYTES # BLD AUTO: 2 K/UL
LYMPHOCYTES NFR BLD AUTO: 25.7 %
MAN DIFF?: NORMAL
MCHC RBC-ENTMCNC: 29.8 PG
MCHC RBC-ENTMCNC: 31.2 GM/DL
MCV RBC AUTO: 95.3 FL
MONOCYTES # BLD AUTO: 0.8 K/UL
MONOCYTES NFR BLD AUTO: 10.3 %
NEUTROPHILS # BLD AUTO: 4.9 K/UL
NEUTROPHILS NFR BLD AUTO: 63.1 %
NITRITE URINE: NEGATIVE
PH URINE: 6
PLATELET # BLD AUTO: 502 K/UL
POTASSIUM SERPL-SCNC: 4.4 MMOL/L
PROT SERPL-MCNC: 7.9 G/DL
PROTEIN URINE: ABNORMAL
RBC # BLD: 4.67 M/UL
RBC # FLD: 15.1 %
SODIUM SERPL-SCNC: 140 MMOL/L
SPECIFIC GRAVITY URINE: 1.01
T4 FREE SERPL-MCNC: 0.9 NG/DL
TRIGL SERPL-MCNC: 90 MG/DL
TSH SERPL-ACNC: 1.34 UIU/ML
UROBILINOGEN URINE: NORMAL
WBC # FLD AUTO: 7.77 K/UL

## 2019-09-12 LAB — HUMAN IMMUNODEFICIENCY VIRUS 1 (HIV-1) QUALITATIVE, RNA: NEGATIVE

## 2019-09-12 RX ADMIN — OXYCODONE AND ACETAMINOPHEN 1 TABLET(S): 5; 325 TABLET ORAL at 16:20

## 2019-09-15 ENCOUNTER — TRANSCRIPTION ENCOUNTER (OUTPATIENT)
Age: 34
End: 2019-09-15

## 2019-09-15 ENCOUNTER — INPATIENT (INPATIENT)
Facility: HOSPITAL | Age: 34
LOS: 3 days | Discharge: HOME | DRG: 493 | End: 2019-09-19
Attending: ORTHOPAEDIC SURGERY | Admitting: ORTHOPAEDIC SURGERY
Payer: COMMERCIAL

## 2019-09-15 VITALS
HEART RATE: 88 BPM | DIASTOLIC BLOOD PRESSURE: 79 MMHG | SYSTOLIC BLOOD PRESSURE: 122 MMHG | RESPIRATION RATE: 22 BRPM | TEMPERATURE: 99 F | OXYGEN SATURATION: 97 %

## 2019-09-15 DIAGNOSIS — S82.202A UNSPECIFIED FRACTURE OF SHAFT OF LEFT TIBIA, INITIAL ENCOUNTER FOR CLOSED FRACTURE: ICD-10-CM

## 2019-09-15 LAB
ALBUMIN SERPL ELPH-MCNC: 4.4 G/DL — SIGNIFICANT CHANGE UP (ref 3.3–5.2)
ALP SERPL-CCNC: 151 U/L — HIGH (ref 40–120)
ALT FLD-CCNC: 69 U/L — HIGH
ANION GAP SERPL CALC-SCNC: 15 MMOL/L — SIGNIFICANT CHANGE UP (ref 5–17)
APTT BLD: 25.5 SEC — LOW (ref 27.5–36.3)
AST SERPL-CCNC: 73 U/L — HIGH
BASOPHILS # BLD AUTO: 0 K/UL — SIGNIFICANT CHANGE UP (ref 0–0.2)
BASOPHILS NFR BLD AUTO: 0 % — SIGNIFICANT CHANGE UP (ref 0–2)
BILIRUB SERPL-MCNC: 0.5 MG/DL — SIGNIFICANT CHANGE UP (ref 0.4–2)
BLD GP AB SCN SERPL QL: SIGNIFICANT CHANGE UP
BUN SERPL-MCNC: 6 MG/DL — LOW (ref 8–20)
CALCIUM SERPL-MCNC: 9.4 MG/DL — SIGNIFICANT CHANGE UP (ref 8.6–10.2)
CHLORIDE SERPL-SCNC: 97 MMOL/L — LOW (ref 98–107)
CO2 SERPL-SCNC: 24 MMOL/L — SIGNIFICANT CHANGE UP (ref 22–29)
CREAT SERPL-MCNC: 0.5 MG/DL — SIGNIFICANT CHANGE UP (ref 0.5–1.3)
EOSINOPHIL # BLD AUTO: 0 K/UL — SIGNIFICANT CHANGE UP (ref 0–0.5)
EOSINOPHIL NFR BLD AUTO: 0 % — SIGNIFICANT CHANGE UP (ref 0–6)
GIANT PLATELETS BLD QL SMEAR: PRESENT — SIGNIFICANT CHANGE UP
GLUCOSE SERPL-MCNC: 87 MG/DL — SIGNIFICANT CHANGE UP (ref 70–115)
HCT VFR BLD CALC: 41.5 % — SIGNIFICANT CHANGE UP (ref 34.5–45)
HGB BLD-MCNC: 13.5 G/DL — SIGNIFICANT CHANGE UP (ref 11.5–15.5)
INR BLD: 1.01 RATIO — SIGNIFICANT CHANGE UP (ref 0.88–1.16)
LYMPHOCYTES # BLD AUTO: 1.43 K/UL — SIGNIFICANT CHANGE UP (ref 1–3.3)
LYMPHOCYTES # BLD AUTO: 18.3 % — SIGNIFICANT CHANGE UP (ref 13–44)
MANUAL SMEAR VERIFICATION: SIGNIFICANT CHANGE UP
MCHC RBC-ENTMCNC: 29.3 PG — SIGNIFICANT CHANGE UP (ref 27–34)
MCHC RBC-ENTMCNC: 32.5 GM/DL — SIGNIFICANT CHANGE UP (ref 32–36)
MCV RBC AUTO: 90.2 FL — SIGNIFICANT CHANGE UP (ref 80–100)
MONOCYTES # BLD AUTO: 0.68 K/UL — SIGNIFICANT CHANGE UP (ref 0–0.9)
MONOCYTES NFR BLD AUTO: 8.7 % — SIGNIFICANT CHANGE UP (ref 2–14)
NEUTROPHILS # BLD AUTO: 5.63 K/UL — SIGNIFICANT CHANGE UP (ref 1.8–7.4)
NEUTROPHILS NFR BLD AUTO: 70.4 % — SIGNIFICANT CHANGE UP (ref 43–77)
NEUTS BAND # BLD: 1.7 % — SIGNIFICANT CHANGE UP (ref 0–8)
PLAT MORPH BLD: NORMAL — SIGNIFICANT CHANGE UP
PLATELET # BLD AUTO: 520 K/UL — HIGH (ref 150–400)
POTASSIUM SERPL-MCNC: 4 MMOL/L — SIGNIFICANT CHANGE UP (ref 3.5–5.3)
POTASSIUM SERPL-SCNC: 4 MMOL/L — SIGNIFICANT CHANGE UP (ref 3.5–5.3)
PROT SERPL-MCNC: 7.9 G/DL — SIGNIFICANT CHANGE UP (ref 6.6–8.7)
PROTHROM AB SERPL-ACNC: 11.6 SEC — SIGNIFICANT CHANGE UP (ref 10–12.9)
RBC # BLD: 4.6 M/UL — SIGNIFICANT CHANGE UP (ref 3.8–5.2)
RBC # FLD: 13.8 % — SIGNIFICANT CHANGE UP (ref 10.3–14.5)
RBC BLD AUTO: NORMAL — SIGNIFICANT CHANGE UP
SODIUM SERPL-SCNC: 136 MMOL/L — SIGNIFICANT CHANGE UP (ref 135–145)
VARIANT LYMPHS # BLD: 0.9 % — SIGNIFICANT CHANGE UP (ref 0–6)
WBC # BLD: 7.81 K/UL — SIGNIFICANT CHANGE UP (ref 3.8–10.5)
WBC # FLD AUTO: 7.81 K/UL — SIGNIFICANT CHANGE UP (ref 3.8–10.5)

## 2019-09-15 PROCEDURE — 73590 X-RAY EXAM OF LOWER LEG: CPT | Mod: 26,LT

## 2019-09-15 PROCEDURE — 99222 1ST HOSP IP/OBS MODERATE 55: CPT

## 2019-09-15 PROCEDURE — 99222 1ST HOSP IP/OBS MODERATE 55: CPT | Mod: 57

## 2019-09-15 PROCEDURE — 73610 X-RAY EXAM OF ANKLE: CPT | Mod: 26,LT

## 2019-09-15 PROCEDURE — 99156 MOD SED OTH PHYS/QHP 5/>YRS: CPT

## 2019-09-15 PROCEDURE — 73564 X-RAY EXAM KNEE 4 OR MORE: CPT | Mod: 26,LT

## 2019-09-15 PROCEDURE — 99285 EMERGENCY DEPT VISIT HI MDM: CPT | Mod: 25

## 2019-09-15 PROCEDURE — 73700 CT LOWER EXTREMITY W/O DYE: CPT | Mod: 26,LT

## 2019-09-15 PROCEDURE — 99157 MOD SED OTHER PHYS/QHP EA: CPT

## 2019-09-15 RX ORDER — DOCUSATE SODIUM 100 MG
100 CAPSULE ORAL THREE TIMES A DAY
Refills: 0 | Status: DISCONTINUED | OUTPATIENT
Start: 2019-09-15 | End: 2019-09-19

## 2019-09-15 RX ORDER — KETAMINE HYDROCHLORIDE 100 MG/ML
30 INJECTION INTRAMUSCULAR; INTRAVENOUS ONCE
Refills: 0 | Status: DISCONTINUED | OUTPATIENT
Start: 2019-09-15 | End: 2019-09-15

## 2019-09-15 RX ORDER — CEFAZOLIN SODIUM 1 G
2000 VIAL (EA) INJECTION ONCE
Refills: 0 | Status: DISCONTINUED | OUTPATIENT
Start: 2019-09-16 | End: 2019-09-19

## 2019-09-15 RX ORDER — SODIUM CHLORIDE 9 MG/ML
1000 INJECTION INTRAMUSCULAR; INTRAVENOUS; SUBCUTANEOUS
Refills: 0 | Status: DISCONTINUED | OUTPATIENT
Start: 2019-09-15 | End: 2019-09-16

## 2019-09-15 RX ORDER — OXYCODONE AND ACETAMINOPHEN 5; 325 MG/1; MG/1
1 TABLET ORAL ONCE
Refills: 0 | Status: DISCONTINUED | OUTPATIENT
Start: 2019-09-15 | End: 2019-09-15

## 2019-09-15 RX ORDER — KETAMINE HYDROCHLORIDE 100 MG/ML
100 INJECTION INTRAMUSCULAR; INTRAVENOUS ONCE
Refills: 0 | Status: DISCONTINUED | OUTPATIENT
Start: 2019-09-15 | End: 2019-09-15

## 2019-09-15 RX ORDER — KETAMINE HYDROCHLORIDE 100 MG/ML
20 INJECTION INTRAMUSCULAR; INTRAVENOUS ONCE
Refills: 0 | Status: DISCONTINUED | OUTPATIENT
Start: 2019-09-15 | End: 2019-09-15

## 2019-09-15 RX ORDER — MORPHINE SULFATE 50 MG/1
4 CAPSULE, EXTENDED RELEASE ORAL ONCE
Refills: 0 | Status: DISCONTINUED | OUTPATIENT
Start: 2019-09-15 | End: 2019-09-15

## 2019-09-15 RX ORDER — ENOXAPARIN SODIUM 100 MG/ML
40 INJECTION SUBCUTANEOUS ONCE
Refills: 0 | Status: COMPLETED | OUTPATIENT
Start: 2019-09-15 | End: 2019-09-15

## 2019-09-15 RX ORDER — HYDROMORPHONE HYDROCHLORIDE 2 MG/ML
0.5 INJECTION INTRAMUSCULAR; INTRAVENOUS; SUBCUTANEOUS
Refills: 0 | Status: DISCONTINUED | OUTPATIENT
Start: 2019-09-15 | End: 2019-09-16

## 2019-09-15 RX ORDER — HYDROMORPHONE HYDROCHLORIDE 2 MG/ML
4 INJECTION INTRAMUSCULAR; INTRAVENOUS; SUBCUTANEOUS
Refills: 0 | Status: DISCONTINUED | OUTPATIENT
Start: 2019-09-15 | End: 2019-09-19

## 2019-09-15 RX ORDER — OXYCODONE HYDROCHLORIDE 5 MG/1
5 TABLET ORAL
Refills: 0 | Status: DISCONTINUED | OUTPATIENT
Start: 2019-09-15 | End: 2019-09-19

## 2019-09-15 RX ORDER — INFLUENZA VIRUS VACCINE 15; 15; 15; 15 UG/.5ML; UG/.5ML; UG/.5ML; UG/.5ML
0.5 SUSPENSION INTRAMUSCULAR ONCE
Refills: 0 | Status: COMPLETED | OUTPATIENT
Start: 2019-09-15 | End: 2019-09-15

## 2019-09-15 RX ORDER — KETOROLAC TROMETHAMINE 30 MG/ML
15 SYRINGE (ML) INJECTION ONCE
Refills: 0 | Status: DISCONTINUED | OUTPATIENT
Start: 2019-09-15 | End: 2019-09-15

## 2019-09-15 RX ORDER — HYDROMORPHONE HYDROCHLORIDE 2 MG/ML
1 INJECTION INTRAMUSCULAR; INTRAVENOUS; SUBCUTANEOUS ONCE
Refills: 0 | Status: DISCONTINUED | OUTPATIENT
Start: 2019-09-15 | End: 2019-09-15

## 2019-09-15 RX ORDER — HYDROMORPHONE HYDROCHLORIDE 2 MG/ML
2 INJECTION INTRAMUSCULAR; INTRAVENOUS; SUBCUTANEOUS
Refills: 0 | Status: DISCONTINUED | OUTPATIENT
Start: 2019-09-15 | End: 2019-09-16

## 2019-09-15 RX ADMIN — HYDROMORPHONE HYDROCHLORIDE 0.5 MILLIGRAM(S): 2 INJECTION INTRAMUSCULAR; INTRAVENOUS; SUBCUTANEOUS at 22:40

## 2019-09-15 RX ADMIN — OXYCODONE AND ACETAMINOPHEN 1 TABLET(S): 5; 325 TABLET ORAL at 11:31

## 2019-09-15 RX ADMIN — HYDROMORPHONE HYDROCHLORIDE 1 MILLIGRAM(S): 2 INJECTION INTRAMUSCULAR; INTRAVENOUS; SUBCUTANEOUS at 12:13

## 2019-09-15 RX ADMIN — KETAMINE HYDROCHLORIDE 20 MILLIGRAM(S): 100 INJECTION INTRAMUSCULAR; INTRAVENOUS at 12:59

## 2019-09-15 RX ADMIN — KETAMINE HYDROCHLORIDE 100 MILLIGRAM(S): 100 INJECTION INTRAMUSCULAR; INTRAVENOUS at 12:54

## 2019-09-15 RX ADMIN — KETAMINE HYDROCHLORIDE 20 MILLIGRAM(S): 100 INJECTION INTRAMUSCULAR; INTRAVENOUS at 13:07

## 2019-09-15 RX ADMIN — HYDROMORPHONE HYDROCHLORIDE 4 MILLIGRAM(S): 2 INJECTION INTRAMUSCULAR; INTRAVENOUS; SUBCUTANEOUS at 16:23

## 2019-09-15 RX ADMIN — KETAMINE HYDROCHLORIDE 30 MILLIGRAM(S): 100 INJECTION INTRAMUSCULAR; INTRAVENOUS at 13:12

## 2019-09-15 RX ADMIN — HYDROMORPHONE HYDROCHLORIDE 4 MILLIGRAM(S): 2 INJECTION INTRAMUSCULAR; INTRAVENOUS; SUBCUTANEOUS at 19:37

## 2019-09-15 RX ADMIN — Medication 15 MILLIGRAM(S): at 14:30

## 2019-09-15 RX ADMIN — HYDROMORPHONE HYDROCHLORIDE 4 MILLIGRAM(S): 2 INJECTION INTRAMUSCULAR; INTRAVENOUS; SUBCUTANEOUS at 21:30

## 2019-09-15 RX ADMIN — KETAMINE HYDROCHLORIDE 20 MILLIGRAM(S): 100 INJECTION INTRAMUSCULAR; INTRAVENOUS at 13:03

## 2019-09-15 RX ADMIN — Medication 15 MILLIGRAM(S): at 14:00

## 2019-09-15 RX ADMIN — ENOXAPARIN SODIUM 40 MILLIGRAM(S): 100 INJECTION SUBCUTANEOUS at 17:49

## 2019-09-15 RX ADMIN — MORPHINE SULFATE 4 MILLIGRAM(S): 50 CAPSULE, EXTENDED RELEASE ORAL at 13:35

## 2019-09-15 RX ADMIN — KETAMINE HYDROCHLORIDE 20 MILLIGRAM(S): 100 INJECTION INTRAMUSCULAR; INTRAVENOUS at 12:57

## 2019-09-15 RX ADMIN — HYDROMORPHONE HYDROCHLORIDE 0.5 MILLIGRAM(S): 2 INJECTION INTRAMUSCULAR; INTRAVENOUS; SUBCUTANEOUS at 21:50

## 2019-09-15 RX ADMIN — KETAMINE HYDROCHLORIDE 20 MILLIGRAM(S): 100 INJECTION INTRAMUSCULAR; INTRAVENOUS at 13:05

## 2019-09-15 RX ADMIN — HYDROMORPHONE HYDROCHLORIDE 4 MILLIGRAM(S): 2 INJECTION INTRAMUSCULAR; INTRAVENOUS; SUBCUTANEOUS at 17:45

## 2019-09-15 RX ADMIN — Medication 100 MILLIGRAM(S): at 21:51

## 2019-09-15 RX ADMIN — HYDROMORPHONE HYDROCHLORIDE 1 MILLIGRAM(S): 2 INJECTION INTRAMUSCULAR; INTRAVENOUS; SUBCUTANEOUS at 12:15

## 2019-09-15 RX ADMIN — MORPHINE SULFATE 4 MILLIGRAM(S): 50 CAPSULE, EXTENDED RELEASE ORAL at 13:20

## 2019-09-15 NOTE — ED PROVIDER NOTE - PROGRESS NOTE DETAILS
Ankle reveals distal tib/ tib fx with displacement. Ortho consulted. Pain meds ordered. Ortho requesting conscious sedation and admission for further stabilization of fx. Pt consented. Lj: ankle reduced. patient tolerated procedure well. post-reduction film done. will admit to ortho with medicine consult for clearance.

## 2019-09-15 NOTE — ED PROVIDER NOTE - PHYSICAL EXAMINATION
Mild distress secondary to pain   L ankle with posterior splint in place   No prepatellar tenderness   A/Ox3, no focal neuro deficits

## 2019-09-15 NOTE — H&P ADULT - NSHPLABSRESULTS_GEN_ALL_CORE
EXAM:  ANKLE-LEFT                          PROCEDURE DATE:  09/15/2019          INTERPRETATION:  TECHNIQUE: Three views left ankle    COMPARISON: None.    CLINICAL HISTORY: Left ankle pain. Status post fall    FINDINGS:    Frontal, lateral and ankle mortise views of the left ankle shows acute   comminuted oblique distal tibial shaft fracture. Fracture lucency extends   into the ankle mortise just medial to the medial malleolus. There is   overlapping of fracture fragments on the lateral view. 2.4 cm step-off   the lateral view. Oblique comminuted distal fibular shaft fracture.   Moderate bimalleolar soft tissue swelling. Consider CT as clinically   warranted.     IMPRESSION: Acute comminuted distal right tibia and fibula shaft   fractures.        SHEREEN SEAY M.D., ATTENDING RADIOLOGIST  This document has been electronically signed. Sep 15 2019  1:16PM       EXAM:  TIBIA FIBULA-LEFT                          PROCEDURE DATE:  09/15/2019          INTERPRETATION:  Radiographs of the left tibia and fibula         CLINICAL INFORMATION: Status post reduction    TECHNIQUE:  Frontal and lateral views of the left tibia and fibula were   obtained.    FINDINGS: 9/15/2019 on earlier in the day available for review.    Status post closed reduction and casting of comminuted distal left tibia   and fibula shaft fractures. There is improved alignment of fracture   fragments. Lateral angulation of tibial fracture fragments with 1 cm   step-off on the AP view. 6 mm step-off of distal fibula shaft fragments   on the lateral view. 5 mm step-off of the distal fibula shaft fracture   fragments on the AP view.    IMPRESSION : Status post closed reduction of distal left tibia and fibula   shaft fractures.        SHEREEN SEAY M.D., ATTENDING RADIOLOGIST  This document has been electronically signed. Sep 15 2019  2:34PM

## 2019-09-15 NOTE — ED PROVIDER NOTE - CARE PLAN
Principal Discharge DX:	Ankle pain Principal Discharge DX:	Tibia/fibula fracture, left, closed, initial encounter

## 2019-09-15 NOTE — H&P ADULT - ATTENDING COMMENTS
Ortho Trauma Attending:  Agree with above PA note.  Note edited where necessary.  Orthopedic Surgery is ready to proceed with surgery pending medical optimization and adequate Operating Room availability. Risks of surgical delay discussed with other providers and staff. Please call with any questions or concerns.     Alex Pérez MD  Orthopaedic Trauma Surgery

## 2019-09-15 NOTE — H&P ADULT - ASSESSMENT
* Case discussed with Dr Greenwood  * Patient will be admitted for Orthopedic surgical intervention of the left lower leg.  * Patient will need medical consult for optimization for OR tomorrow   * Patient must abide by strict elevation of the left lower leg. Encourage ROM of toes  * Ice packs to anterior lower leg.   * IV fluids ordered, anticoagulation x1 dose, NPO after midnight tonight, Preop abx ordered  * NWB left LE  * Bed rest with strict elevation left lower extremity   * Pain control

## 2019-09-15 NOTE — ED PROCEDURE NOTE - ATTENDING CONTRIBUTION TO CARE
I, Vijay Calhoun, independently evaluated the patient and discussed the procedure with the resident. I evaluated the patient prior to and after the procedure and the patient tolerated the procedure well.

## 2019-09-15 NOTE — ED ADULT NURSE NOTE - OBJECTIVE STATEMENT
pt alert and oriented x4 came in from urgent care c/o falling in shower hitting left shin and reports a tib fib fracture. pt respirations even unlabored. pt c/o pain 10/10, ortho at bedside. positive pedal pulses present. pt educated on plan of care, pt able to successfully teach back plan of care to RN, RN will continue to reeducate pt during hospital stay.

## 2019-09-15 NOTE — ED PROVIDER NOTE - CLINICAL SUMMARY MEDICAL DECISION MAKING FREE TEXT BOX
34 year old female with no PMHx presents to the ED for L ankle pain s/p slip and fall yesterday. Xray from  unable to be uploaded, will re-xray ankle and control pain.

## 2019-09-15 NOTE — CONSULT NOTE ADULT - SUBJECTIVE AND OBJECTIVE BOX
PMD : See Calderón  Cardio : none    chief complaint of Left lower leg pain s/p fall in shower       HPI:  34yr old female with hx anxiety, morbid obesity and hx Gastric bypass in 2009, presented after a fall in her shower last night and sustained injury to her left lower leg. Patient states she tried several times to get up and ambulate yet was unable to. Patient went to urgent care this morning. Patient was told she had fractures in her leg and needed to come to the ER.   Denies any other complaints at present, Says she could walk 1-2blocks and go up and down a flight of stairs without any symptoms of sob, palpitations, chest pain, denies any complications post By pass surgery.       PAST MEDICAL & SURGICAL HISTORY:  morbid obesity  Gastric Bypass Surgery  Cholecystectomy  Hx abortions  Hx tubal ligation   Vit D deficiency       Social History:  Tabacco - denies  ETOH - denies   Illicit drug abuse - denies    FAMILY HISTORY:  denies any significant family history of HTN, DM, CAD    Allergies    No Known Allergies    Intolerances        HOME MEDICATIONS :   Vitamin D and Paxil 40mg daily    REVIEW OF SYSTEMS:    CONSTITUTIONAL: No fever, weight loss, or fatigue  EYES: No eye pain, visual disturbances, or discharge  NECK: No pain or stiffness  RESPIRATORY: No cough, wheezing, chills, No shortness of breath  CARDIOVASCULAR: No chest pain, palpitations, dizziness, or leg swelling  GASTROINTESTINAL: No abdominal or epigastric pain. No nausea, vomiting, or hematemesis; No diarrhea  GENITOURINARY: No dysuria, frequency, hematuria, or incontinence  NEUROLOGICAL: No headaches, memory loss, loss of strength, numbness, or tremors  SKIN: No itching, burning, rashes, or lesions   ENDOCRINE: No heat or cold intolerance; No hair loss  MUSCULOSKELETAL:   PSYCHIATRIC: No depression, +anxiety  HEME/LYMPH: No easy bruising, or bleeding gums  ALLERGY AND IMMUNOLOGIC: No hives or eczema    MEDICATIONS  (STANDING):  docusate sodium 100 milliGRAM(s) Oral three times a day  enoxaparin Injectable 40 milliGRAM(s) SubCutaneous once  sodium chloride 0.9%. 1000 milliLiter(s) (150 mL/Hr) IV Continuous <Continuous>    MEDICATIONS  (PRN):  HYDROmorphone   Tablet 2 milliGRAM(s) Oral every 3 hours PRN Moderate Pain (4 - 6)  HYDROmorphone   Tablet 4 milliGRAM(s) Oral every 3 hours PRN Severe Pain (7 - 10)  HYDROmorphone  Injectable 0.5 milliGRAM(s) IV Push every 3 hours PRN breakthrough pain  oxyCODONE    IR 5 milliGRAM(s) Oral every 3 hours PRN Mild Pain (1 - 3)      Vital Signs Last 24 Hrs  T(C): 37.1 (15 Sep 2019 12:40), Max: 37.1 (15 Sep 2019 10:52)  T(F): 98.8 (15 Sep 2019 12:40), Max: 98.8 (15 Sep 2019 10:52)  HR: 88 (15 Sep 2019 13:41) (74 - 93)  BP: 134/2 (15 Sep 2019 13:41) (102/52 - 142/70)  BP(mean): --  RR: 18 (15 Sep 2019 13:41) (18 - 22)  SpO2: 100% (15 Sep 2019 13:41) (97% - 100%)    PHYSICAL EXAM:    GENERAL: NAD, well-groomed, morbidly obese, tattoos noted over right arm and forearm   HEAD:  Atraumatic, Normocephalic  EYES: EOMI, PERRLA, conjunctiva and sclera clear  NECK: Supple,   NERVOUS SYSTEM:  Alert & Oriented X3, Good concentration  CHEST/LUNG: CTA  b/l,  no rales, rhonchi  HEART: Regular rate and rhythm; No murmurs  ABDOMEN: Soft, Nontender, Nondistended; Bowel sounds present  EXTREMITIES:  left leg in wrap. No clubbing, cyanosis, or edema   SKIN: No rashes or lesions        LABS:                        13.5   7.81  )-----------( 520      ( 15 Sep 2019 12:29 )             41.5     09-15    136  |  97<L>  |  6.0<L>  ----------------------------<  87  4.0   |  24.0  |  0.50    Ca    9.4      15 Sep 2019 12:29    TPro  7.9  /  Alb  4.4  /  TBili  0.5  /  DBili  x   /  AST  73<H>  /  ALT  69<H>  /  AlkPhos  151<H>  09-15    PT/INR - ( 15 Sep 2019 12:29 )   PT: 11.6 sec;   INR: 1.01 ratio         PTT - ( 15 Sep 2019 12:29 )  PTT:25.5 sec        RADIOLOGY & ADDITIONAL STUDIES:

## 2019-09-15 NOTE — H&P ADULT - NSHPPHYSICALEXAM_GEN_ALL_CORE
Patient is A&O x 3, obese female age 34. Patient mildly upset and observed crying. Patient also observed on phone holding normal conversation regarding what her needs are for the stay in hospital.   Left lower leg: splint removed to reveal moderate to severe swelling from mid lower leg and distal. Moderate ecchymosis diffusely over lower leg. Moderate to severe recurvatum of the left knee. DP pulse appreciated +2. Sensation intact. EHL/FHL intact.    JOINT / Fracture REDUCTION  PROCEDURE NOTE: Joint/ Fracture reduction     Performed by: Yvette Guadalupe PA-C and Alex Varner PA-C  Indication: Dislocation of [left/right] [joint]     Consent: The risks and benefits of the procedure including incomplete reduction, secondary fracture, nerve damage and bleeding were explained and the patient verbalized their understanding and wished to proceed with the procedure.   Universal Protocol: a time out was performed and the correct patient and site were verified     Procedure: Neurovascular exam intact prior to joint reduction.  Skin exam: no bleeding or lacerations at the fracture site. Conscious sedation performed by emergency department attending physician. Reduction of the left distal tibia/fibula was accomplished via manual manipulation and traction. The left lower leg was immobilized with a long long posterior and U splint.  Distally, the extremity was neurovascular intact following the procedure.  The patient tolerated the procedure well.    Post reduction films obtained and demonstrated an adequate reduction.    Complications: None Patient is A&O x 3, obese female age 34. Patient mildly upset and observed crying. Patient also observed on phone holding normal conversation regarding what her needs are for the stay in hospital.   Left lower leg: splint removed to reveal moderate to severe swelling from mid lower leg and distal. Obvious deformity noted. Moderate ecchymosis diffusely over lower leg. Moderate to severe recurvatum of the left knee. DP pulse appreciated +2. Sensation intact. EHL/FHL intact.    JOINT / Fracture REDUCTION  PROCEDURE NOTE: Joint/ Fracture reduction     Performed by: Yvette Guadalupe PA-C and Alex Varner PA-C  Indication: Dislocation of [left/right] [joint]     Consent: The risks and benefits of the procedure including incomplete reduction, secondary fracture, nerve damage and bleeding were explained and the patient verbalized their understanding and wished to proceed with the procedure.   Universal Protocol: a time out was performed and the correct patient and site were verified     Procedure: Neurovascular exam intact prior to joint reduction.  Skin exam: no bleeding or lacerations at the fracture site. Conscious sedation performed by emergency department attending physician. Reduction of the left distal tibia/fibula was accomplished via manual manipulation and traction. The left lower leg was immobilized with a long long posterior and U splint.  Distally, the extremity was neurovascular intact following the procedure.  The patient tolerated the procedure well.    Post reduction films obtained and demonstrated an adequate reduction.    Complications: None

## 2019-09-15 NOTE — ED PROVIDER NOTE - OBJECTIVE STATEMENT
34 year old female with no PMHx presents to the ED for L ankle pain s/p slip and fall while in the shower yesterday. Pt was sent in by Urgent care for tib/ tib fx. Pt did not hit head, neck pain, back pain, no LOC. Posterior ankle splint in place. Pt has been unable to bear weight since the fall yesterday.

## 2019-09-15 NOTE — ED PROCEDURE NOTE - NS_POSTPROCCAREGUIDE_ED_ALL_ED
Patient is now fully awake, with vital signs and temperature stable, hydration is adequate, patients Rafiq’s  score is at baseline (or greater than 8), patient and escort has received  discharge education.

## 2019-09-15 NOTE — ED PROVIDER NOTE - ATTENDING CONTRIBUTION TO CARE
I, Vijay Calhoun, have personally performed a face to face diagnostic evaluation on this patient. I have reviewed the ACP note and agree with the history, exam and plan of care, except as noted.    33 yo F p/w displaced distal tibia/fibular fracture s/p fall. left foot and leg swelling. sensation intact.  2+ perihperhal pulse. Ortho consulted. consious sedation perform for reduction and new splint placement. Will admit to  for OR.

## 2019-09-15 NOTE — CONSULT NOTE ADULT - ASSESSMENT
34yr old female with hx anxiety, morbid obesity and hx Gastric bypass in 2009, presented after a fall diagnosed with left distal tibial and fibular fracture planned for Surgical intervention. Medicine consulted for pre-operative risk stratification,       # Lt distal tibial -fibular fracture -   Pt meets >4 METS level of activity, denies any cardiac/pulmonary symptoms, however is morbidly obese which puts her at intermediate risk. Will get EKG prior to Surgery. However, There is no clear absolute contraindication for the said procedure or surgery.   pain control   DVT px per ortho     # Morbid obesity - will benefit from weight reduction  recommendation dietary eval on outpatient      # anxiety - ct paxil 40mdg daily    Will follow.

## 2019-09-15 NOTE — H&P ADULT - HISTORY OF PRESENT ILLNESS
Patient states she fell in her shower last night and sustained injury to her left lower leg. Patient states she tried several times to get up and ambulate yet was unable to. Patient went to urgent care this morning. Patient was told she had fractures in her leg and needed to come to the ER. Patient on stretcher in ER with complaints of left lower leg pain. Patient states she has no paresthesias just pain.

## 2019-09-16 ENCOUNTER — TRANSCRIPTION ENCOUNTER (OUTPATIENT)
Age: 34
End: 2019-09-16

## 2019-09-16 LAB
ABO RH CONFIRMATION: SIGNIFICANT CHANGE UP
HCG SERPL-ACNC: <4 MIU/ML — SIGNIFICANT CHANGE UP

## 2019-09-16 PROCEDURE — 27823 TREATMENT OF ANKLE FRACTURE: CPT | Mod: AS,LT

## 2019-09-16 PROCEDURE — 76000 FLUOROSCOPY <1 HR PHYS/QHP: CPT | Mod: 26

## 2019-09-16 PROCEDURE — 27759 TREATMENT OF TIBIA FRACTURE: CPT | Mod: LT

## 2019-09-16 PROCEDURE — 77071 MNL APPL STRS JT RADIOGRAPHY: CPT

## 2019-09-16 PROCEDURE — 93010 ELECTROCARDIOGRAM REPORT: CPT

## 2019-09-16 PROCEDURE — 27823 TREATMENT OF ANKLE FRACTURE: CPT | Mod: LT

## 2019-09-16 PROCEDURE — 27759 TREATMENT OF TIBIA FRACTURE: CPT | Mod: AS,LT

## 2019-09-16 PROCEDURE — 73590 X-RAY EXAM OF LOWER LEG: CPT | Mod: 26,LT

## 2019-09-16 PROCEDURE — 99232 SBSQ HOSP IP/OBS MODERATE 35: CPT

## 2019-09-16 RX ORDER — ONDANSETRON 8 MG/1
4 TABLET, FILM COATED ORAL ONCE
Refills: 0 | Status: DISCONTINUED | OUTPATIENT
Start: 2019-09-16 | End: 2019-09-16

## 2019-09-16 RX ORDER — KETOROLAC TROMETHAMINE 30 MG/ML
15 SYRINGE (ML) INJECTION EVERY 6 HOURS
Refills: 0 | Status: DISCONTINUED | OUTPATIENT
Start: 2019-09-16 | End: 2019-09-16

## 2019-09-16 RX ORDER — OXYCODONE HYDROCHLORIDE 5 MG/1
10 TABLET ORAL
Refills: 0 | Status: DISCONTINUED | OUTPATIENT
Start: 2019-09-16 | End: 2019-09-17

## 2019-09-16 RX ORDER — CEFAZOLIN SODIUM 1 G
2000 VIAL (EA) INJECTION
Refills: 0 | Status: COMPLETED | OUTPATIENT
Start: 2019-09-16 | End: 2019-09-17

## 2019-09-16 RX ORDER — HYDROMORPHONE HYDROCHLORIDE 2 MG/ML
0.5 INJECTION INTRAMUSCULAR; INTRAVENOUS; SUBCUTANEOUS
Refills: 0 | Status: DISCONTINUED | OUTPATIENT
Start: 2019-09-16 | End: 2019-09-16

## 2019-09-16 RX ORDER — ENOXAPARIN SODIUM 100 MG/ML
40 INJECTION SUBCUTANEOUS DAILY
Refills: 0 | Status: DISCONTINUED | OUTPATIENT
Start: 2019-09-17 | End: 2019-09-19

## 2019-09-16 RX ORDER — ACETAMINOPHEN 500 MG
975 TABLET ORAL EVERY 8 HOURS
Refills: 0 | Status: DISCONTINUED | OUTPATIENT
Start: 2019-09-16 | End: 2019-09-19

## 2019-09-16 RX ORDER — SODIUM CHLORIDE 9 MG/ML
1000 INJECTION, SOLUTION INTRAVENOUS
Refills: 0 | Status: DISCONTINUED | OUTPATIENT
Start: 2019-09-16 | End: 2019-09-19

## 2019-09-16 RX ORDER — HYDROMORPHONE HYDROCHLORIDE 2 MG/ML
1 INJECTION INTRAMUSCULAR; INTRAVENOUS; SUBCUTANEOUS
Refills: 0 | Status: DISCONTINUED | OUTPATIENT
Start: 2019-09-16 | End: 2019-09-19

## 2019-09-16 RX ORDER — ACETAMINOPHEN 500 MG
1000 TABLET ORAL ONCE
Refills: 0 | Status: COMPLETED | OUTPATIENT
Start: 2019-09-16 | End: 2019-09-17

## 2019-09-16 RX ADMIN — OXYCODONE HYDROCHLORIDE 10 MILLIGRAM(S): 5 TABLET ORAL at 22:29

## 2019-09-16 RX ADMIN — HYDROMORPHONE HYDROCHLORIDE 0.5 MILLIGRAM(S): 2 INJECTION INTRAMUSCULAR; INTRAVENOUS; SUBCUTANEOUS at 11:40

## 2019-09-16 RX ADMIN — Medication 100 MILLIGRAM(S): at 21:00

## 2019-09-16 RX ADMIN — Medication 15 MILLIGRAM(S): at 23:56

## 2019-09-16 RX ADMIN — HYDROMORPHONE HYDROCHLORIDE 1 MILLIGRAM(S): 2 INJECTION INTRAMUSCULAR; INTRAVENOUS; SUBCUTANEOUS at 17:02

## 2019-09-16 RX ADMIN — HYDROMORPHONE HYDROCHLORIDE 0.5 MILLIGRAM(S): 2 INJECTION INTRAMUSCULAR; INTRAVENOUS; SUBCUTANEOUS at 06:00

## 2019-09-16 RX ADMIN — Medication 15 MILLIGRAM(S): at 18:03

## 2019-09-16 RX ADMIN — Medication 100 MILLIGRAM(S): at 05:11

## 2019-09-16 RX ADMIN — HYDROMORPHONE HYDROCHLORIDE 4 MILLIGRAM(S): 2 INJECTION INTRAMUSCULAR; INTRAVENOUS; SUBCUTANEOUS at 00:37

## 2019-09-16 RX ADMIN — Medication 15 MILLIGRAM(S): at 19:00

## 2019-09-16 RX ADMIN — Medication 975 MILLIGRAM(S): at 22:30

## 2019-09-16 RX ADMIN — HYDROMORPHONE HYDROCHLORIDE 4 MILLIGRAM(S): 2 INJECTION INTRAMUSCULAR; INTRAVENOUS; SUBCUTANEOUS at 01:30

## 2019-09-16 RX ADMIN — HYDROMORPHONE HYDROCHLORIDE 2 MILLIGRAM(S): 2 INJECTION INTRAMUSCULAR; INTRAVENOUS; SUBCUTANEOUS at 08:21

## 2019-09-16 RX ADMIN — OXYCODONE HYDROCHLORIDE 10 MILLIGRAM(S): 5 TABLET ORAL at 21:29

## 2019-09-16 RX ADMIN — HYDROMORPHONE HYDROCHLORIDE 2 MILLIGRAM(S): 2 INJECTION INTRAMUSCULAR; INTRAVENOUS; SUBCUTANEOUS at 09:00

## 2019-09-16 RX ADMIN — Medication 100 MILLIGRAM(S): at 21:31

## 2019-09-16 RX ADMIN — HYDROMORPHONE HYDROCHLORIDE 0.5 MILLIGRAM(S): 2 INJECTION INTRAMUSCULAR; INTRAVENOUS; SUBCUTANEOUS at 05:11

## 2019-09-16 RX ADMIN — HYDROMORPHONE HYDROCHLORIDE 0.5 MILLIGRAM(S): 2 INJECTION INTRAMUSCULAR; INTRAVENOUS; SUBCUTANEOUS at 10:33

## 2019-09-16 RX ADMIN — Medication 975 MILLIGRAM(S): at 21:30

## 2019-09-16 RX ADMIN — HYDROMORPHONE HYDROCHLORIDE 4 MILLIGRAM(S): 2 INJECTION INTRAMUSCULAR; INTRAVENOUS; SUBCUTANEOUS at 03:32

## 2019-09-16 NOTE — BRIEF OPERATIVE NOTE - OPERATION/FINDINGS
comminuted left tibia fracture with trimal ankle fracture    implants: synthes tibia EX nail, cannulated screws x 2

## 2019-09-16 NOTE — DISCHARGE NOTE PROVIDER - NSDCFUADDINST_GEN_ALL_CORE_FT
The patient will be seen in the office between 2-3 weeks for wound check. Sutures/Staples/Tape will be removed at that time. Patient may shower after post-op day #5. The dressing is to be removed on post op day #9 (9/25/19). IF THE DRESSING BECOMES SOILED BEFORE THE REMOVAL DATE, CHANGE WITH A SIMILAR DRESSING. IF THE DRESSING BECOMES STAINED WITH DISCHARGE, CONTACT THE OFFICE FOR FURTHER DIRECTIONS.  The patient will contact the office if the wound becomes red, has increasing pain, develops bleeding or discharge, an injury occurs, or has other concerns. The patient will continue PT for gait training. The patient will continue LOVENOX for 4 weeks for blood clot prevention. The patient will take OXYCODONE AND TYLENOL for pain control and titrate according to prescription and patient needs. The patient will take Colace while taking oxycodone to prevent narcotic associated constipation.  Additionally, increase water intake (drink at least 8 glasses of water daily) and try adding fiber to the diet by eating fruits, vegetables and foods that are rich in grains. If constipation is experienced, contact the medical/primary care provider to discuss further treatment options. The patient is FULL weight bearing. The patient will be seen in the office between 2-3 weeks for wound check. Sutures/Staples/Tape will be removed at that time. Patient may shower after post-op day #5. The dressing is to be removed on post op day #9 (9/25/19). IF THE DRESSING BECOMES SOILED BEFORE THE REMOVAL DATE, CHANGE WITH A SIMILAR DRESSING. IF THE DRESSING BECOMES STAINED WITH DISCHARGE, CONTACT THE OFFICE FOR FURTHER DIRECTIONS.  The patient will contact the office if the wound becomes red, has increasing pain, develops bleeding or discharge, an injury occurs, or has other concerns. The patient will continue PT for gait training. The patient will take ASPIRIN for 6 weeks for blood clot prevention. The patient will take OXYCODONE AND TYLENOL for pain control and titrate according to prescription and patient needs. The patient will take Colace while taking oxycodone to prevent narcotic associated constipation.  Additionally, increase water intake (drink at least 8 glasses of water daily) and try adding fiber to the diet by eating fruits, vegetables and foods that are rich in grains. If constipation is experienced, contact the medical/primary care provider to discuss further treatment options. The patient is FULL weight bearing. The patient will be seen in the office between 2-3 weeks for wound check. Sutures/Staples/Tape will be removed at that time. Patient may shower after post-op day #3. The dressing is to be removed on post op day #9 (9/25/19). IF THE DRESSING BECOMES SOILED BEFORE THE REMOVAL DATE, CHANGE WITH A SIMILAR DRESSING. IF THE DRESSING BECOMES STAINED WITH DISCHARGE, CONTACT THE OFFICE FOR FURTHER DIRECTIONS.  The patient will contact the office if the wound becomes red, has increasing pain, develops bleeding or discharge, an injury occurs, or has other concerns. The patient will continue PT for gait training. The patient will take ASPIRIN for 4 weeks for blood clot prevention. The patient will take OXYCODONE AND TYLENOL for pain control and titrate according to prescription and patient needs. The patient will take Colace while taking oxycodone to prevent narcotic associated constipation.  Additionally, increase water intake (drink at least 8 glasses of water daily) and try adding fiber to the diet by eating fruits, vegetables and foods that are rich in grains. If constipation is experienced, contact the medical/primary care provider to discuss further treatment options. The patient is NON-weight bearing of the left lower leg. The patient will be seen in the office between 2-3 weeks for wound check.  Patient may shower after post-op day #3. The dressing is to be removed on post op day #9 (9/25/19). IF THE DRESSING BECOMES SOILED BEFORE THE REMOVAL DATE, CHANGE WITH A SIMILAR DRESSING. IF THE DRESSING BECOMES STAINED WITH DISCHARGE, CONTACT THE OFFICE FOR FURTHER DIRECTIONS.  The patient will contact the office if the wound becomes red, has increasing pain, develops bleeding or discharge, an injury occurs, or has other concerns. The patient will continue PT for gait training. The patient will take ASPIRIN for 4 weeks for blood clot prevention. The patient will take DILAUDID AND TYLENOL for pain control and titrate according to prescription and patient needs. The patient will take Colace while taking oxycodone to prevent narcotic associated constipation.  Additionally, increase water intake (drink at least 8 glasses of water daily) and try adding fiber to the diet by eating fruits, vegetables and foods that are rich in grains. If constipation is experienced, contact the medical/primary care provider to discuss further treatment options. The patient is NON-weight bearing of the left lower leg.

## 2019-09-16 NOTE — DISCHARGE NOTE PROVIDER - NSDCCPCAREPLAN_GEN_ALL_CORE_FT
PRINCIPAL DISCHARGE DIAGNOSIS  Diagnosis: Tibia/fibula fracture, left, closed, initial encounter  Assessment and Plan of Treatment:

## 2019-09-16 NOTE — DISCHARGE NOTE PROVIDER - HOSPITAL COURSE
The patient underwent a LEFT TIBIA INTRAMEDULARY NAIL FIXATION on 9/16/19for treatment of a left tibia fracture. The patient received antibiotics consistent with SCIP guidelines. The patient was medically cleared and underwent the procedure and had no intra-operative complications. Post-operatively, the patient was seen by medicine and PT. The patient received LOVENOX for DVTP. The patient received pain medications per orthopedic pain managment protocol and the pain was appropriately controlled. Patient was evaluated by PT and instructed on gait training. The patient was FULL weight bearing. The patient did not have any post-operative medical complications. The patient was discharged in stable condition. The patient underwent a LEFT TIBIA INTRAMEDULARY NAIL FIXATION on 9/16/19for treatment of a left tibia fracture. The patient received antibiotics consistent with SCIP guidelines. The patient was medically cleared and underwent the procedure and had no intra-operative complications. Post-operatively, the patient was seen by medicine and PT. The patient received LOVENOX for DVTP. The patient received pain medications per orthopedic pain managment protocol and the pain was appropriately controlled. Patient was evaluated by PT and instructed on gait training. The patient was NON-weight bearing of the  left lower leg. The patient did not have any post-operative medical complications. The patient was discharged in stable condition. The patient underwent a LEFT TIBIA INTRAMEDULLARY NAIL FIXATION on 9/16/19 for treatment of a left tibia fracture. The patient received antibiotics consistent with SCIP guidelines. The patient was medically cleared and underwent the procedure and had no intra-operative complications. Post-operatively, the patient was seen by medicine and PT. The patient received LOVENOX for DVTP. The patient received pain medications per orthopedic pain managment protocol and the pain was appropriately controlled. Patient was evaluated by PT and instructed on gait training. The patient was NON-weight bearing of the left lower leg. The patient did not have any post-operative medical complications. The patient was discharged in stable condition.

## 2019-09-16 NOTE — BRIEF OPERATIVE NOTE - COMMENTS
post-op plan: NWB LLE x 1 month then WBAT in CAM boot, PT/OT, ancef per SCIP,  LMWH while in house, ASA for d/c, f/u ortho 10/3 for staple removal

## 2019-09-16 NOTE — PROGRESS NOTE ADULT - SUBJECTIVE AND OBJECTIVE BOX
Orthopedic PA Postop Note  Patient S/P LEFT TIBIA IM NAIL  Patient in bed comfortable   LEFT Leg  Dressing C/D/I - ACE wrap without staining  DP Pulse intact   cap refill < 2 sec  Calf Soft NT  EHL/FHL intact   Sensation intact to light touch    Vital Signs Last 24 Hrs  T(C): 36.8 (16 Sep 2019 17:50), Max: 36.9 (16 Sep 2019 11:23)  T(F): 98.3 (16 Sep 2019 17:50), Max: 98.4 (16 Sep 2019 11:23)  HR: 89 (16 Sep 2019 17:50) (66 - 90)  BP: 97/68 (16 Sep 2019 17:50) (97/55 - 138/82)  BP(mean): --  RR: 16 (16 Sep 2019 17:50) (13 - 20)  SpO2: 96% (16 Sep 2019 17:50) (96% - 100%)        A/P: 34F S/P LEFT TIBIA IM NAIL  1. DVTP - LOVENOX  2. Physical Therapy   3. Pain Control as clinically indicated

## 2019-09-16 NOTE — BRIEF OPERATIVE NOTE - NSICDXBRIEFPROCEDURE_GEN_ALL_CORE_FT
PROCEDURES:  Open reduction of trimalleolar fracture of ankle 16-Sep-2019 15:19:59  Alex Pérez  Nailing, tibia 16-Sep-2019 15:19:41  Alex Pérez

## 2019-09-16 NOTE — PROGRESS NOTE ADULT - SUBJECTIVE AND OBJECTIVE BOX
Patient seen and examined at bedside. Plan for OR today with Dr. Pérez. Patient comfortable in bed, c/o mild-mod pain at fracture site. Denies fever/chills, SOB/chest pain, abdominal pain, numbness/tingling. no other complaints at this time.    Vital Signs Last 24 Hrs  T(C): 36.8 (16 Sep 2019 04:58), Max: 37.1 (15 Sep 2019 10:52)  T(F): 98.2 (16 Sep 2019 04:58), Max: 98.8 (15 Sep 2019 10:52)  HR: 71 (16 Sep 2019 04:58) (70 - 93)  BP: 116/75 (16 Sep 2019 04:58) (102/52 - 142/70)  BP(mean): --  RR: 20 (16 Sep 2019 04:58) (18 - 22)  SpO2: 98% (16 Sep 2019 04:58) (97% - 100%)    LLE: Splint C/D/I. Compartments felt through splint are soft, compressible. Ext warm, cap refill brisk. +EHL/FHL. SILT.    A/P: 34 y.o F with left distal tib/fib fx  - NWB  - pain control  - medical consult appreciated  - NPO  - plan for OR today Patient seen and examined at bedside. Plan for OR today with Dr. Pérez. Patient comfortable in bed, c/o mild-mod pain at fracture site. Denies fever/chills, SOB/chest pain, abdominal pain, numbness/tingling. no other complaints at this time.    Vital Signs Last 24 Hrs  T(C): 36.8 (16 Sep 2019 04:58), Max: 37.1 (15 Sep 2019 10:52)  T(F): 98.2 (16 Sep 2019 04:58), Max: 98.8 (15 Sep 2019 10:52)  HR: 71 (16 Sep 2019 04:58) (70 - 93)  BP: 116/75 (16 Sep 2019 04:58) (102/52 - 142/70)  BP(mean): --  RR: 20 (16 Sep 2019 04:58) (18 - 22)  SpO2: 98% (16 Sep 2019 04:58) (97% - 100%)    LLE: Splint C/D/I. Compartments felt through splint are soft, compressible. Ext warm, cap refill brisk. DP 2+. +EHL/FHL. SILT.    A/P: 34 y.o F with left distal tib/fib fx  - NWB  - pain control  - medical consult appreciated  - NPO  - plan for OR today

## 2019-09-16 NOTE — PROGRESS NOTE ADULT - ASSESSMENT
34yr old female with hx anxiety, morbid obesity and hx Gastric bypass in 2009, presented after a fall diagnosed with left distal tibial and fibular fracture planned for Surgical intervention. Medicine consulted for pre-operative risk stratification,       # Lt distal tibial -fibular fracture -   There is no absolute contraindication for the said procedure or surgery.   DVT px per ortho     # Morbid obesity - will benefit from weight reduction  recommendation dietary eval on outpatient      # anxiety - ct paxil 40mdg daily    Will follow.

## 2019-09-16 NOTE — DISCHARGE NOTE PROVIDER - CARE PROVIDER_API CALL
Alex Pérez)  Orthopaedic Surgery  [address] [place] [address]  Phone: [phone]  Fax: [phone]  Follow Up Time:

## 2019-09-16 NOTE — PROGRESS NOTE ADULT - SUBJECTIVE AND OBJECTIVE BOX
CLARE CABRALZ    [id number]    34y      Female    CC: left leg pain /p fall  Distal tibia/fibular fracture - c/o severe pain - Pain meds are not working  awaiting surgery today     INTERVAL HPI/OVERNIGHT EVENTS: no acute events    REVIEW OF SYSTEMS:    CONSTITUTIONAL: No fever, weight loss  RESPIRATORY: No cough, wheezing, No shortness of breath  CARDIOVASCULAR: No chest pain, palpitations  GASTROINTESTINAL: No abdominal or epigastric pain. No nausea, vomiting        Vital Signs Last 24 Hrs  T(C): 36.9 (16 Sep 2019 11:23), Max: 37.1 (15 Sep 2019 12:40)  T(F): 98.4 (16 Sep 2019 11:23), Max: 98.8 (15 Sep 2019 12:40)  HR: 66 (16 Sep 2019 11:23) (66 - 93)  BP: 102/60 (16 Sep 2019 11:23) (97/55 - 142/70)  BP(mean): --  RR: 16 (16 Sep 2019 11:23) (16 - 20)  SpO2: 98% (16 Sep 2019 11:23) (96% - 100%)    PHYSICAL EXAM:    GENERAL: NAD, well-groomed, obese  HEENT: PERRL, +EOMI  NECK: soft, Supple  CHEST/LUNG: Clear to percussion bilaterally; No wheezing  HEART: S1S2+, Regular rate and rhythm; No murmurs  ABDOMEN: Soft, Nontender, Nondistended; Bowel sounds present  EXTREMITIES: , No clubbing, cyanosis, or edema  SKIN: No rashes or lesions  NEURO: AAOX3      09-15 @ 07:01  -  09-16 @ 07:00  --------------------------------------------------------  IN: 1800 mL / OUT: 0 mL / NET: 1800 mL        LABS:                        13.5   7.81  )-----------( 520      ( 15 Sep 2019 12:29 )             41.5     09-15    136  |  97<L>  |  6.0<L>  ----------------------------<  87  4.0   |  24.0  |  0.50    Ca    9.4      15 Sep 2019 12:29    TPro  7.9  /  Alb  4.4  /  TBili  0.5  /  DBili  x   /  AST  73<H>  /  ALT  69<H>  /  AlkPhos  151<H>  09-15    PT/INR - ( 15 Sep 2019 12:29 )   PT: 11.6 sec;   INR: 1.01 ratio         PTT - ( 15 Sep 2019 12:29 )  PTT:25.5 sec        MEDICATIONS  (STANDING):  ceFAZolin   IVPB 2000 milliGRAM(s) IV Intermittent once  docusate sodium 100 milliGRAM(s) Oral three times a day  influenza   Vaccine 0.5 milliLiter(s) IntraMuscular once  sodium chloride 0.9%. 1000 milliLiter(s) (150 mL/Hr) IV Continuous <Continuous>    MEDICATIONS  (PRN):  HYDROmorphone   Tablet 2 milliGRAM(s) Oral every 3 hours PRN Moderate Pain (4 - 6)  HYDROmorphone   Tablet 4 milliGRAM(s) Oral every 3 hours PRN Severe Pain (7 - 10)  HYDROmorphone  Injectable 0.5 milliGRAM(s) IV Push every 3 hours PRN breakthrough pain  oxyCODONE    IR 5 milliGRAM(s) Oral every 3 hours PRN Mild Pain (1 - 3)      RADIOLOGY & ADDITIONAL TESTS:    EKG = tracing reviewed  _ NSR

## 2019-09-16 NOTE — BRIEF OPERATIVE NOTE - NSICDXBRIEFPREOP_GEN_ALL_CORE_FT
Assessment  1  Visit for screening mammogram (V76 12) (Z12 31)   2  Encounter for routine gynecological examination (V72 31) (Z01 419)   3  Overactive bladder (596 51) (N32 81)    Plan  Overactive bladder    · VESIcare 5 MG Oral Tablet; TAKE 1 TABLET DAILY   Rx By: Zamzam Hanna; Dispense: 90 Days ; #:90 Tablet; Refill: 0;Overactive bladder; QUIRINO = N; Sent To: [facility]/PHARMACY #[id number]  · * MAMMO SCREENING BILATERAL W CAD; Status:Hold For - Scheduling; Requestedfor:12Oct2017;    Perform:[facility] Radiology; Due:12Oct2018; Ordered;bladder; Ordered By:aKli Poe;   · Follow-up visit in 1 year Evaluation and Treatment  Follow-up  Status: Hold For -Scheduling  Requested for: 12Oct2017   Ordered; Overactive bladder; Ordered By: Zamzam Hanna Performed:  Due: [id number]    Discussion/Summary    I informed Keon Barry at that there is the possibility that she may have overactive bladder as the cause of her incontinence and wetting episodes  She does not have any urethral hypermobility nor evidence of a cystocele  Therefore I would like to try her on VESIcare to see if this will help with her bladder issues  Information regarding the drug was given to her and adverse effects reviewed  The patient is to call with any questions or concerns  Keon Barry will be seen back in 4 weeks to review and see how she is doing on the medication  The patient has the current Goals: Reduction in bladder accidents  Patient is able to Self-Care  PATIENT EDUCATION RECORD  Indication for Services: Annual gyn exam    Possible side effects of new medications were reviewed with the patient/guardian today  The treatment plan was reviewed with the patient/guardian  The patient/guardian understands and agrees with the treatment plan      Chief Complaint    1  Visit For: Preventive General Multisystem Exam    History of Present Illness  GYN HM, Adult Female [facility]: The patient is being seen for a health maintenance and gynecology evaluation   The last health maintenance visit was 1 year(s) ago  General Health: The patient's health since the last visit is described as good  She has regular dental visits  -- She denies vision problems  -- She denies hearing loss  Immunizations status: up to date  Lifestyle:  She consumes a diverse and healthy diet  -- She does not have any weight concerns  -- She exercises regularly  -- She does not use tobacco -- She denies alcohol use  -- She denies drug use  Reproductive health: the patient is postmenopausal--   she reports no menstrual problems  Screening: cancer screening reviewed and current  metabolic screening reviewed and current  risk screening reviewed and current  Additional History: Conchita Crain has noted that she has bladder pressure, occasional episodes of incontinence and does need to wear a pad quite often  She did have a sling procedure back in 2009  She does feel that she is not able to empty her bladder well enough  Review of Systems   Constitutional: No fever, no chills, feels well, no tiredness, no recent weight gain or loss  ENT: no ear ache, no loss of hearing, no nosebleeds or nasal discharge, no sore throat or hoarseness  Cardiovascular: no complaints of slow or fast heart rate, no chest pain, no palpitations, no leg claudication or lower extremity edema  Respiratory: no complaints of shortness of breath, no wheezing, no dyspnea on exertion, no orthopnea or PND  Breasts: no complaints of breast pain, breast lump or nipple discharge  Gastrointestinal: no complaints of abdominal pain, no constipation, no nausea or diarrhea, no vomiting, no bloody stools  Genitourinary: as noted in HPI  Musculoskeletal: no complaints of arthralgia, no myalgia, no joint swelling or stiffness, no limb pain or swelling  Integumentary: no complaints of skin rash or lesion, no itching or dry skin, no skin wounds  Neurological: no complaints of headache, no confusion, no numbness or tingling, no dizziness or fainting  Active Problems  1  Breast disorder (611 9) (N64 9)   2  Encounter for routine gynecological examination (V72 31) (Z01 419)   3  History of self breast exam   4  Hypothyroidism (244 9) (E03 9)   5  Postmenopausal atrophic vaginitis (627 3) (N95 2)   6  Vaginal odor (625 8) (N89 8)   7  Visit for screening mammogram (V76 12) (Z12 31)    Past Medical History   · History of Breast tumor (239 3) (D49 3)   · History of  4   · History of miscarriage (V13 29) (Z87 59)   · History of seasonal allergies (V15 09) (Z88 9)   · History of self breast exam    Surgical History   · History of Back Surgery   · History of Complete Colonoscopy   · History of Neck Surgery   · History of Vaginal Sling Operation For Stress Incontinence    Family History  Mother    · Family history of skin cancer (V16 8) (Z80 8)  Father    · Family history of rheumatoid arthritis (V17 7) (Z82 61)  Sister    · Family history of malignant neoplasm of breast (V16 3) (Z80 3)    Social History     · Always uses seat belt   · Caffeine use (V49 89) (F15 90)   · Denied: History of Drug use   ·    · Never A Smoker   · No alcohol use   · Bahai    Current Meds   1  Citalopram Hydrobromide 20 MG Oral Tablet; Therapy: [id number] to (Last Rx:[id number])  Requested for: 17Cqs3483 Ordered   2  Nasacort AQ 55 MCG/ACT AERS; Therapy: 81Bvx3832 to (Last Ilya Pals)  Requested for: 31Ecx2681 Ordered   3  NexIUM PACK; Therapy: (Recorded:2014) to Recorded   4  Synthroid 125 MCG Oral Tablet; Therapy: (Recorded:2014) to Recorded    Allergies  1  Easprin TBEC   2  Penicillins    Vitals   Recorded: 42LCH2544 82:68XF   Systolic 451, LUE, Sitting   Diastolic 62, LUE, Sitting   Height 5 ft 4 5 in   Weight 177 lb 4 oz   BMI Calculated 29 96   BSA Calculated 1 87   LMP POSTMENOPAUSAL       Physical Exam   Constitutional  General appearance: No acute distress, well appearing and well nourished  Neck  Neck: Normal, supple, trachea midline, no masses  Thyroid: Normal, no thyromegaly  Genitourinary  External genitalia: Normal and no lesions appreciated  Vagina: Normal, no lesions or dryness appreciated  -- No evidence of cystocele, TVT O sling is in place  Urethra: Normal    Urethral meatus: Normal    Bladder: Normal, soft, non-tender and no prolapse or masses appreciated  Cervix: Surgically absent  Uterus: Surgically absent  Adnexa/parametria: Normal, non-tender and no fullness or masses appreciated  Anus, perineum, and rectum: Normal sphincter tone, no masses, and no prolapse  Chest  Breasts: Normal and no dimpling or skin changes noted  Abdomen  Abdomen: Normal, non-tender, and no organomegaly noted  Liver and spleen: No hepatomegaly or splenomegaly  Examination for hernias: No hernias appreciated  Lymphatic  Palpation of lymph nodes in neck, axillae, groin and/or other locations: No lymphadenopathy or masses noted  Skin  Skin and subcutaneous tissue: Normal skin turgor and no rashes  Palpation of skin and subcutaneous tissue: Normal    Psychiatric  Orientation to person, place, and time: Normal    Mood and affect: Normal        Signatures   Electronically signed by :  Jake Cloud MD; Oct 12 2017  3:11PM EST                       (Author) PRE-OP DIAGNOSIS:  Fracture of shaft of left tibia 16-Sep-2019 15:20:37  Alex Pérez  Ankle fracture, left 16-Sep-2019 15:21:02  Alex Pérez

## 2019-09-16 NOTE — DISCHARGE NOTE PROVIDER - NSDCACTIVITY_GEN_ALL_CORE
Do not drive or operate machinery/Stairs allowed/Do not make important decisions Walking - Outdoors allowed/Walking - Indoors allowed/Stairs allowed/Do not drive or operate machinery/Do not make important decisions

## 2019-09-16 NOTE — PROGRESS NOTE ADULT - SUBJECTIVE AND OBJECTIVE BOX
Delayed Entry  Patient was seen at 1:30 am on 9/16 for neurovascular check  Patient reports pain in left lower extremity.  Splint in place. Extremity elevated.  Motor exam intact but limited due to splint.  Sensation intact distally to light touch  Palpable DP pulse. Warm and perfused  Patient to go to OR 9/16

## 2019-09-16 NOTE — BRIEF OPERATIVE NOTE - NSICDXBRIEFPOSTOP_GEN_ALL_CORE_FT
POST-OP DIAGNOSIS:  Ankle fracture, left 16-Sep-2019 15:21:11  Alex Pérez  Fracture of shaft of left tibia 16-Sep-2019 15:21:05  Alex Pérez

## 2019-09-17 ENCOUNTER — TRANSCRIPTION ENCOUNTER (OUTPATIENT)
Age: 34
End: 2019-09-17

## 2019-09-17 DIAGNOSIS — Z71.89 OTHER SPECIFIED COUNSELING: ICD-10-CM

## 2019-09-17 PROCEDURE — 99232 SBSQ HOSP IP/OBS MODERATE 35: CPT

## 2019-09-17 RX ORDER — ASPIRIN/CALCIUM CARB/MAGNESIUM 324 MG
1 TABLET ORAL
Qty: 60 | Refills: 0
Start: 2019-09-17 | End: 2019-10-16

## 2019-09-17 RX ORDER — HYDROMORPHONE HYDROCHLORIDE 2 MG/ML
2 INJECTION INTRAMUSCULAR; INTRAVENOUS; SUBCUTANEOUS
Refills: 0 | Status: DISCONTINUED | OUTPATIENT
Start: 2019-09-17 | End: 2019-09-19

## 2019-09-17 RX ORDER — HYDROMORPHONE HYDROCHLORIDE 2 MG/ML
4 INJECTION INTRAMUSCULAR; INTRAVENOUS; SUBCUTANEOUS
Refills: 0 | Status: DISCONTINUED | OUTPATIENT
Start: 2019-09-17 | End: 2019-09-17

## 2019-09-17 RX ORDER — SENNOSIDES/DOCUSATE SODIUM 8.6MG-50MG
2 TABLET ORAL
Qty: 20 | Refills: 0
Start: 2019-09-17 | End: 2019-09-26

## 2019-09-17 RX ORDER — DIPHENHYDRAMINE HCL 50 MG
25 CAPSULE ORAL ONCE
Refills: 0 | Status: COMPLETED | OUTPATIENT
Start: 2019-09-17 | End: 2019-09-17

## 2019-09-17 RX ORDER — KETOROLAC TROMETHAMINE 30 MG/ML
15 SYRINGE (ML) INJECTION EVERY 6 HOURS
Refills: 0 | Status: DISCONTINUED | OUTPATIENT
Start: 2019-09-17 | End: 2019-09-17

## 2019-09-17 RX ORDER — ACETAMINOPHEN 500 MG
3 TABLET ORAL
Qty: 0 | Refills: 0 | DISCHARGE
Start: 2019-09-17

## 2019-09-17 RX ORDER — OXYCODONE HYDROCHLORIDE 5 MG/1
1 TABLET ORAL
Qty: 25 | Refills: 0
Start: 2019-09-17

## 2019-09-17 RX ADMIN — Medication 400 MILLIGRAM(S): at 13:51

## 2019-09-17 RX ADMIN — OXYCODONE HYDROCHLORIDE 10 MILLIGRAM(S): 5 TABLET ORAL at 03:48

## 2019-09-17 RX ADMIN — OXYCODONE HYDROCHLORIDE 10 MILLIGRAM(S): 5 TABLET ORAL at 13:44

## 2019-09-17 RX ADMIN — Medication 15 MILLIGRAM(S): at 22:40

## 2019-09-17 RX ADMIN — Medication 25 MILLIGRAM(S): at 22:36

## 2019-09-17 RX ADMIN — OXYCODONE HYDROCHLORIDE 10 MILLIGRAM(S): 5 TABLET ORAL at 17:48

## 2019-09-17 RX ADMIN — Medication 15 MILLIGRAM(S): at 23:06

## 2019-09-17 RX ADMIN — Medication 100 MILLIGRAM(S): at 05:00

## 2019-09-17 RX ADMIN — Medication 15 MILLIGRAM(S): at 00:11

## 2019-09-17 RX ADMIN — HYDROMORPHONE HYDROCHLORIDE 1 MILLIGRAM(S): 2 INJECTION INTRAMUSCULAR; INTRAVENOUS; SUBCUTANEOUS at 12:00

## 2019-09-17 RX ADMIN — Medication 975 MILLIGRAM(S): at 22:36

## 2019-09-17 RX ADMIN — OXYCODONE HYDROCHLORIDE 10 MILLIGRAM(S): 5 TABLET ORAL at 11:00

## 2019-09-17 RX ADMIN — HYDROMORPHONE HYDROCHLORIDE 1 MILLIGRAM(S): 2 INJECTION INTRAMUSCULAR; INTRAVENOUS; SUBCUTANEOUS at 11:52

## 2019-09-17 RX ADMIN — Medication 100 MILLIGRAM(S): at 05:16

## 2019-09-17 RX ADMIN — Medication 100 MILLIGRAM(S): at 22:36

## 2019-09-17 RX ADMIN — Medication 1000 MILLIGRAM(S): at 14:00

## 2019-09-17 RX ADMIN — ENOXAPARIN SODIUM 40 MILLIGRAM(S): 100 INJECTION SUBCUTANEOUS at 11:54

## 2019-09-17 RX ADMIN — OXYCODONE HYDROCHLORIDE 10 MILLIGRAM(S): 5 TABLET ORAL at 09:52

## 2019-09-17 RX ADMIN — HYDROMORPHONE HYDROCHLORIDE 1 MILLIGRAM(S): 2 INJECTION INTRAMUSCULAR; INTRAVENOUS; SUBCUTANEOUS at 20:02

## 2019-09-17 RX ADMIN — Medication 975 MILLIGRAM(S): at 23:06

## 2019-09-17 RX ADMIN — HYDROMORPHONE HYDROCHLORIDE 1 MILLIGRAM(S): 2 INJECTION INTRAMUSCULAR; INTRAVENOUS; SUBCUTANEOUS at 20:30

## 2019-09-17 RX ADMIN — Medication 975 MILLIGRAM(S): at 06:16

## 2019-09-17 RX ADMIN — Medication 25 MILLIGRAM(S): at 06:00

## 2019-09-17 RX ADMIN — OXYCODONE HYDROCHLORIDE 10 MILLIGRAM(S): 5 TABLET ORAL at 04:48

## 2019-09-17 RX ADMIN — HYDROMORPHONE HYDROCHLORIDE 4 MILLIGRAM(S): 2 INJECTION INTRAMUSCULAR; INTRAVENOUS; SUBCUTANEOUS at 23:36

## 2019-09-17 RX ADMIN — OXYCODONE HYDROCHLORIDE 10 MILLIGRAM(S): 5 TABLET ORAL at 14:00

## 2019-09-17 RX ADMIN — Medication 100 MILLIGRAM(S): at 13:44

## 2019-09-17 RX ADMIN — OXYCODONE HYDROCHLORIDE 10 MILLIGRAM(S): 5 TABLET ORAL at 20:02

## 2019-09-17 RX ADMIN — Medication 975 MILLIGRAM(S): at 05:16

## 2019-09-17 NOTE — PHYSICAL THERAPY INITIAL EVALUATION ADULT - GAIT TRAINING, PT EVAL
Time Frame:  7 days   Goal:   Pt will ambulate with crutches with modified independence 150 feet or greater.      Stairs: pt will navigate 10 stairs with 1 rail with crutches with modified independence.

## 2019-09-17 NOTE — PROGRESS NOTE ADULT - ASSESSMENT
34yr old female with hx anxiety, morbid obesity and hx Gastric bypass in 2009, presented after a fall diagnosed with left distal tibial and fibular fracture planned for Surgical intervention. Medicine consulted for pre-operative risk stratification,       # Lt distal tibial -fibular fracture - s/p IM nail of Tibia   Pain control   Bowel regimen   Incentive spirometry  DVT px - per ortho   PT    # Morbid obesity - will benefit from weight reduction  recommendation dietary eval on outpatient      # anxiety - ct paxil 40mdg daily    Will follow.

## 2019-09-17 NOTE — DISCHARGE NOTE NURSING/CASE MANAGEMENT/SOCIAL WORK - PATIENT PORTAL LINK FT
You can access the FollowMyHealth Patient Portal offered by [facility] by registering at the following website: [email or web address]. By joining [facility]’s FollowMyHealth portal, you will also be able to view your health information using other applications (apps) compatible with our system.

## 2019-09-17 NOTE — DIETITIAN INITIAL EVALUATION ADULT. - OTHER INFO
34 year old female with hx anxiety, morbid obesity and gastric bypass, presents after a fall diagnosed with left distal tibial and fibular fracture, now s/p intramedullary nail fixation left tibia. Pt reports good appetite/po intake PTA. States she had gastric bypass in 2009 and lost weight initially, was able to get down to 125 lbs x 2 years, however, states since 2016 has been gaining weight back and is now ~230 lbs. Aware current weight 249.1 lbs, which may be increased due to fluid retention (3+). Pt contributes weight gain to poor dietary choices, such as high consumption of soda, coffee with added sugar, and late night snacking. Also reports she can go the whole day without eating food (will just drink soda/coffee). Snacks typically consist of cheese and crackers. Encouraged pt to consume three balanced-meals per day. Also encouraged to make protein a priority at all meals to aid in healing and satiety. Educated on the importance of portion sizes and limiting added sugar (provided alternative options). Pt verbalized good understanding. Written literature provided as well.

## 2019-09-17 NOTE — PHYSICAL THERAPY INITIAL EVALUATION ADULT - ACTIVE RANGE OF MOTION EXAMINATION, REHAB EVAL
Left knee and ankle ROM limited by pain and wrapped in ace wrap, unable and not appropriate to assess at this time./RLE Active ROM was WNL (within normal limits)

## 2019-09-17 NOTE — PROGRESS NOTE ADULT - SUBJECTIVE AND OBJECTIVE BOX
CLARE CABRALZ    [id number]    34y      Female    CC: left leg pain /p fall  Distal tibia/fibular fracture s/p IM tibial nail   c/o severe pain - Pain meds are not working  ambulated with PT    INTERVAL HPI/OVERNIGHT EVENTS: no acute events    REVIEW OF SYSTEMS:    CONSTITUTIONAL: No fever, weight loss  RESPIRATORY: No cough, wheezing, No shortness of breath  CARDIOVASCULAR: No chest pain, palpitations  GASTROINTESTINAL: No abdominal or epigastric pain. No nausea, vomiting        Vital Signs Last 24 Hrs  T(C): 36.8 (17 Sep 2019 08:35), Max: 37.1 (16 Sep 2019 23:36)  T(F): 98.3 (17 Sep 2019 08:35), Max: 98.7 (16 Sep 2019 23:36)  HR: 69 (17 Sep 2019 08:35) (68 - 90)  BP: 125/81 (17 Sep 2019 08:35) (97/68 - 138/82)  BP(mean): --  RR: 18 (17 Sep 2019 08:35) (13 - 19)  SpO2: 98% (17 Sep 2019 04:58) (96% - 100%)    PHYSICAL EXAM:    GENERAL: NAD, well-groomed, obese  HEENT: PERRL, +EOMI  NECK: soft, Supple  CHEST/LUNG: Clear to percussion bilaterally; No wheezing  HEART: S1S2+, Regular rate and rhythm; No murmurs  EXTREMITIES: , No clubbing, cyanosis, or edema  SKIN: No rashes or lesions  NEURO: AAOX3        LABS:        MEDICATIONS  (STANDING):  acetaminophen   Tablet .. 975 milliGRAM(s) Oral every 8 hours  ceFAZolin   IVPB 2000 milliGRAM(s) IV Intermittent once  docusate sodium 100 milliGRAM(s) Oral three times a day  enoxaparin Injectable 40 milliGRAM(s) SubCutaneous daily  influenza   Vaccine 0.5 milliLiter(s) IntraMuscular once  lactated ringers. 1000 milliLiter(s) (100 mL/Hr) IV Continuous <Continuous>    MEDICATIONS  (PRN):  HYDROmorphone   Tablet 4 milliGRAM(s) Oral every 3 hours PRN Severe Pain (7 - 10)  HYDROmorphone  Injectable 1 milliGRAM(s) IV Push every 3 hours PRN breakthrough pain  oxyCODONE    IR 5 milliGRAM(s) Oral every 3 hours PRN Mild Pain (1 - 3)  oxyCODONE    IR 10 milliGRAM(s) Oral every 3 hours PRN Moderate Pain (4 - 6)        RADIOLOGY & ADDITIONAL TESTS:    EKG = tracing reviewed  _ NSR

## 2019-09-17 NOTE — PROGRESS NOTE ADULT - SUBJECTIVE AND OBJECTIVE BOX
Pt c/p LLE pain. Pt seen and evaluated, found resting comfortably in bed in NAD. Pt reports pain of left lower left.     ACE wrap removed, web roll peeled back. Compartments soft and compressible. 2+ DP pulse palpated. Sensation intact to light touch, No increased pain with passive ext/flexion of toes. +EHL/FHL intact.     Pain meds adjusted to help better control patients pain.

## 2019-09-17 NOTE — PHYSICAL THERAPY INITIAL EVALUATION ADULT - ADDITIONAL COMMENTS
Pt lives in an apartment  with 2 steps to enter with jessica rails and 10 steps down to apartment with one rail.   Pt owns medical equipment: none  Pt lives with: spouse who is not available to provide 24hr care.

## 2019-09-17 NOTE — PROGRESS NOTE ADULT - SUBJECTIVE AND OBJECTIVE BOX
ORTHOPEDIC POST-OP PROGRESS NOTE:    Name: CLARE SAEZ    MR #: [id number]    Procedure: Intramedulary nail of left tibia      DOS: 9/16/2019      Pt comfortable without complaints and pain is now controlled with surgery. Denies CP, SOB, N/V, numbness/tingling. She is pending PT evaluation.       Vital Signs Last 24 Hrs  T(C): 36.9 (09-17-19 @ 04:58), Max: 36.9 (09-17-19 @ 04:58)  T(F): 98.5 (09-17-19 @ 04:58), Max: 98.5 (09-17-19 @ 04:58)  HR: 68 (09-17-19 @ 04:58) (68 - 68)  BP: 103/70 (09-17-19 @ 04:58) (103/70 - 103/70)  BP(mean): --  RR: 18 (09-17-19 @ 04:58) (18 - 18)  SpO2: 98% (09-17-19 @ 04:58) (98% - 98%)      General Exam:    General: Pt Alert and oriented, NAD, controlled pain.    Dressings Bulky compressive wrap is C/D/I. No bleeding.    Pulses: 2+ dorsalis pedis pulse. Cap refill < 2 sec.    Sensation: Grossly intact to light touch without deficit.    Motor: No motor weaknesses found.        A/P: 34y Female  POD# 1 s/p intramedullary nail fixation left tibia    - Stable  - Pain Control  - DVT ppx as prescribed  - PT eval  - Weight bearing status: NWB LLE  * Likely home tomorrow

## 2019-09-18 PROCEDURE — 99232 SBSQ HOSP IP/OBS MODERATE 35: CPT

## 2019-09-18 RX ADMIN — Medication 975 MILLIGRAM(S): at 21:34

## 2019-09-18 RX ADMIN — Medication 100 MILLIGRAM(S): at 21:34

## 2019-09-18 RX ADMIN — HYDROMORPHONE HYDROCHLORIDE 4 MILLIGRAM(S): 2 INJECTION INTRAMUSCULAR; INTRAVENOUS; SUBCUTANEOUS at 12:30

## 2019-09-18 RX ADMIN — Medication 975 MILLIGRAM(S): at 06:43

## 2019-09-18 RX ADMIN — HYDROMORPHONE HYDROCHLORIDE 4 MILLIGRAM(S): 2 INJECTION INTRAMUSCULAR; INTRAVENOUS; SUBCUTANEOUS at 17:32

## 2019-09-18 RX ADMIN — ENOXAPARIN SODIUM 40 MILLIGRAM(S): 100 INJECTION SUBCUTANEOUS at 12:30

## 2019-09-18 RX ADMIN — HYDROMORPHONE HYDROCHLORIDE 4 MILLIGRAM(S): 2 INJECTION INTRAMUSCULAR; INTRAVENOUS; SUBCUTANEOUS at 22:04

## 2019-09-18 RX ADMIN — Medication 100 MILLIGRAM(S): at 14:09

## 2019-09-18 RX ADMIN — HYDROMORPHONE HYDROCHLORIDE 4 MILLIGRAM(S): 2 INJECTION INTRAMUSCULAR; INTRAVENOUS; SUBCUTANEOUS at 21:34

## 2019-09-18 RX ADMIN — HYDROMORPHONE HYDROCHLORIDE 4 MILLIGRAM(S): 2 INJECTION INTRAMUSCULAR; INTRAVENOUS; SUBCUTANEOUS at 13:25

## 2019-09-18 RX ADMIN — Medication 975 MILLIGRAM(S): at 15:05

## 2019-09-18 RX ADMIN — HYDROMORPHONE HYDROCHLORIDE 4 MILLIGRAM(S): 2 INJECTION INTRAMUSCULAR; INTRAVENOUS; SUBCUTANEOUS at 18:42

## 2019-09-18 RX ADMIN — Medication 975 MILLIGRAM(S): at 06:11

## 2019-09-18 RX ADMIN — Medication 975 MILLIGRAM(S): at 14:09

## 2019-09-18 RX ADMIN — HYDROMORPHONE HYDROCHLORIDE 4 MILLIGRAM(S): 2 INJECTION INTRAMUSCULAR; INTRAVENOUS; SUBCUTANEOUS at 00:06

## 2019-09-18 RX ADMIN — Medication 100 MILLIGRAM(S): at 06:11

## 2019-09-18 RX ADMIN — HYDROMORPHONE HYDROCHLORIDE 4 MILLIGRAM(S): 2 INJECTION INTRAMUSCULAR; INTRAVENOUS; SUBCUTANEOUS at 09:06

## 2019-09-18 RX ADMIN — HYDROMORPHONE HYDROCHLORIDE 4 MILLIGRAM(S): 2 INJECTION INTRAMUSCULAR; INTRAVENOUS; SUBCUTANEOUS at 10:05

## 2019-09-18 RX ADMIN — Medication 975 MILLIGRAM(S): at 22:04

## 2019-09-18 NOTE — PROGRESS NOTE ADULT - SUBJECTIVE AND OBJECTIVE BOX
Name: CLARE SAEZ    MR #: [id number]    Procedure: Intramedulary nail of left tibia pod #2      DOS: 9/16/2019      Pt comfortable without complaints and pain is now controlled with surgery. Denies CP, SOB, N/V, numbness/tingling. She has been OOB with PT and will do stairs today       Vital Signs Last 24 Hrs  T(C): 36.1 (17 Sep 2019 21:22), Max: 36.8 (17 Sep 2019 08:35)  T(F): 97 (17 Sep 2019 21:22), Max: 98.3 (17 Sep 2019 08:35)  HR: 79 (18 Sep 2019 06:10) (69 - 80)  BP: 124/81 (18 Sep 2019 06:10) (120/77 - 134/88)  BP(mean): --  RR: 19 (18 Sep 2019 06:10) (18 - 19)  SpO2: 96% (18 Sep 2019 06:10) (96% - 98%)      General: Pt Alert and oriented, NAD, controlled pain.    LLE Dressings Bulky compressive wrap is C/D/I. No bleeding. Dressing removed, all incision sites with staples in place, no active bleeding, new dressings placed   calf soft   1+swelling to foot noted    Pulses: 2+ dorsalis pedis pulse. Cap refill < 2 sec.    Sensation: Grossly intact to light touch without deficit.    Motor: No motor weaknesses found.        A/P: 34y Female  POD# 2  s/p intramedullary nail fixation left tibia    - Stable  - Pain Control  - DVT ppx as prescribed  - PT eval  - Weight bearing status: NWB LLE  * Likely home today after PT

## 2019-09-18 NOTE — PROGRESS NOTE ADULT - ASSESSMENT
34yr old female with hx anxiety, morbid obesity and hx Gastric bypass in 2009, presented after a fall diagnosed with left distal tibial and fibular fracture planned for Surgical intervention. Medicine consulted for pre-operative risk stratification,       # Lt distal tibial -fibular fracture - s/p IM nail of Tibia   Pain control   Bowel regimen   Incentive spirometry  DVT px - per ortho   PT    # Morbid obesity - will benefit from weight reduction  recommendation dietary eval on outpatient    # elevated LFTs - suspect 2/2 STRAUSS - obesity related - Fatty liver - will need f/u with PMD in 3-4 weeks repeat LFTs.   # anxiety - ct paxil 40mdg daily    Medically stable for discharge. discussed with RN, SW

## 2019-09-18 NOTE — PROGRESS NOTE ADULT - SUBJECTIVE AND OBJECTIVE BOX
CLARE CABRALZ    [id number]    34y      Female    CC: left leg pain s/p fall  Distal tibia/fibular fracture s/p IM tibial nail POD#2  pain has improved   ambulated with PT  per pt, she godinez snot have keys to her house and noone in her house today, she will leave tomorrow     INTERVAL HPI/OVERNIGHT EVENTS: no acute events    REVIEW OF SYSTEMS:    CONSTITUTIONAL: No fever, weight loss  RESPIRATORY: No cough, wheezing, No shortness of breath  CARDIOVASCULAR: No chest pain, palpitations  GASTROINTESTINAL: No abdominal or epigastric pain. No nausea, vomiting      Vital Signs Last 24 Hrs  T(C): 36.8 (18 Sep 2019 07:10), Max: 36.8 (18 Sep 2019 07:10)  T(F): 98.3 (18 Sep 2019 07:10), Max: 98.3 (18 Sep 2019 07:10)  HR: 71 (18 Sep 2019 07:10) (71 - 80)  BP: 131/80 (18 Sep 2019 07:10) (120/77 - 134/88)  BP(mean): --  RR: 18 (18 Sep 2019 07:10) (18 - 19)  SpO2: 99% (18 Sep 2019 07:10) (96% - 99%)    PHYSICAL EXAM:    GENERAL: NAD, well-groomed, obese  HEENT: PERRL, +EOMI  NECK: soft, Supple  CHEST/LUNG: Clear to percussion bilaterally; No wheezing  HEART: S1S2+, Regular rate and rhythm; No murmurs  EXTREMITIES: , No clubbing, cyanosis, or edema  SKIN: No rashes or lesions  NEURO: AAOX3        LABS:    MEDICATIONS  (STANDING):  acetaminophen   Tablet .. 975 milliGRAM(s) Oral every 8 hours  ceFAZolin   IVPB 2000 milliGRAM(s) IV Intermittent once  docusate sodium 100 milliGRAM(s) Oral three times a day  enoxaparin Injectable 40 milliGRAM(s) SubCutaneous daily  influenza   Vaccine 0.5 milliLiter(s) IntraMuscular once  lactated ringers. 1000 milliLiter(s) (100 mL/Hr) IV Continuous <Continuous>    MEDICATIONS  (PRN):  HYDROmorphone   Tablet 4 milliGRAM(s) Oral every 3 hours PRN Severe Pain (7 - 10)  HYDROmorphone   Tablet 2 milliGRAM(s) Oral every 3 hours PRN Moderate Pain (4 - 6)  HYDROmorphone  Injectable 1 milliGRAM(s) IV Push every 3 hours PRN breakthrough pain  oxyCODONE    IR 5 milliGRAM(s) Oral every 3 hours PRN Mild Pain (1 - 3)          RADIOLOGY & ADDITIONAL TESTS:

## 2019-09-19 VITALS
TEMPERATURE: 99 F | SYSTOLIC BLOOD PRESSURE: 135 MMHG | OXYGEN SATURATION: 96 % | DIASTOLIC BLOOD PRESSURE: 86 MMHG | HEART RATE: 70 BPM | RESPIRATION RATE: 18 BRPM

## 2019-09-19 PROCEDURE — 73610 X-RAY EXAM OF ANKLE: CPT

## 2019-09-19 PROCEDURE — 96375 TX/PRO/DX INJ NEW DRUG ADDON: CPT

## 2019-09-19 PROCEDURE — 85027 COMPLETE CBC AUTOMATED: CPT

## 2019-09-19 PROCEDURE — 93005 ELECTROCARDIOGRAM TRACING: CPT

## 2019-09-19 PROCEDURE — 96374 THER/PROPH/DIAG INJ IV PUSH: CPT

## 2019-09-19 PROCEDURE — 85610 PROTHROMBIN TIME: CPT

## 2019-09-19 PROCEDURE — 80053 COMPREHEN METABOLIC PANEL: CPT

## 2019-09-19 PROCEDURE — 73564 X-RAY EXAM KNEE 4 OR MORE: CPT

## 2019-09-19 PROCEDURE — 73590 X-RAY EXAM OF LOWER LEG: CPT

## 2019-09-19 PROCEDURE — 85730 THROMBOPLASTIN TIME PARTIAL: CPT

## 2019-09-19 PROCEDURE — 99157 MOD SED OTHER PHYS/QHP EA: CPT

## 2019-09-19 PROCEDURE — 86850 RBC ANTIBODY SCREEN: CPT

## 2019-09-19 PROCEDURE — 73700 CT LOWER EXTREMITY W/O DYE: CPT

## 2019-09-19 PROCEDURE — C1713: CPT

## 2019-09-19 PROCEDURE — 76000 FLUOROSCOPY <1 HR PHYS/QHP: CPT

## 2019-09-19 PROCEDURE — 86900 BLOOD TYPING SEROLOGIC ABO: CPT

## 2019-09-19 PROCEDURE — 99156 MOD SED OTH PHYS/QHP 5/>YRS: CPT

## 2019-09-19 PROCEDURE — 97530 THERAPEUTIC ACTIVITIES: CPT

## 2019-09-19 PROCEDURE — C1889: CPT

## 2019-09-19 PROCEDURE — C1769: CPT

## 2019-09-19 PROCEDURE — 97116 GAIT TRAINING THERAPY: CPT

## 2019-09-19 PROCEDURE — 36415 COLL VENOUS BLD VENIPUNCTURE: CPT

## 2019-09-19 PROCEDURE — 99232 SBSQ HOSP IP/OBS MODERATE 35: CPT

## 2019-09-19 PROCEDURE — 99285 EMERGENCY DEPT VISIT HI MDM: CPT | Mod: 25

## 2019-09-19 PROCEDURE — 97163 PT EVAL HIGH COMPLEX 45 MIN: CPT

## 2019-09-19 PROCEDURE — 84702 CHORIONIC GONADOTROPIN TEST: CPT

## 2019-09-19 PROCEDURE — 86901 BLOOD TYPING SEROLOGIC RH(D): CPT

## 2019-09-19 RX ORDER — HYDROMORPHONE HYDROCHLORIDE 2 MG/ML
1 INJECTION INTRAMUSCULAR; INTRAVENOUS; SUBCUTANEOUS
Qty: 30 | Refills: 0
Start: 2019-09-19 | End: 2019-09-23

## 2019-09-19 RX ADMIN — HYDROMORPHONE HYDROCHLORIDE 4 MILLIGRAM(S): 2 INJECTION INTRAMUSCULAR; INTRAVENOUS; SUBCUTANEOUS at 12:10

## 2019-09-19 RX ADMIN — ENOXAPARIN SODIUM 40 MILLIGRAM(S): 100 INJECTION SUBCUTANEOUS at 12:10

## 2019-09-19 RX ADMIN — Medication 975 MILLIGRAM(S): at 12:11

## 2019-09-19 RX ADMIN — HYDROMORPHONE HYDROCHLORIDE 4 MILLIGRAM(S): 2 INJECTION INTRAMUSCULAR; INTRAVENOUS; SUBCUTANEOUS at 08:42

## 2019-09-19 RX ADMIN — Medication 975 MILLIGRAM(S): at 06:22

## 2019-09-19 RX ADMIN — Medication 975 MILLIGRAM(S): at 05:52

## 2019-09-19 RX ADMIN — Medication 975 MILLIGRAM(S): at 13:10

## 2019-09-19 RX ADMIN — Medication 100 MILLIGRAM(S): at 05:51

## 2019-09-19 RX ADMIN — HYDROMORPHONE HYDROCHLORIDE 4 MILLIGRAM(S): 2 INJECTION INTRAMUSCULAR; INTRAVENOUS; SUBCUTANEOUS at 13:10

## 2019-09-19 RX ADMIN — Medication 100 MILLIGRAM(S): at 12:11

## 2019-09-19 RX ADMIN — HYDROMORPHONE HYDROCHLORIDE 4 MILLIGRAM(S): 2 INJECTION INTRAMUSCULAR; INTRAVENOUS; SUBCUTANEOUS at 07:42

## 2019-09-19 NOTE — PROGRESS NOTE ADULT - REASON FOR ADMISSION
Left lower leg pain s/p fall in shower

## 2019-09-19 NOTE — PROGRESS NOTE ADULT - SUBJECTIVE AND OBJECTIVE BOX
Ortho Progress note    Name: CLARE SAEZ    MR #: [id number]    Procedure:  left tibia IM nail  Surgeon: Dr. Pérez    Pt comfortable without complaints, pain controlled  Denies CP, SOB, N/V, numbness/tingling     General Exam:  Vital Signs Last 24 Hrs  T(C): 36.9 (09-19-19 @ 05:57), Max: 36.9 (09-19-19 @ 05:57)  T(F): 98.5 (09-19-19 @ 05:57), Max: 98.5 (09-19-19 @ 05:57)  HR: 75 (09-19-19 @ 05:57) (75 - 75)  BP: 103/71 (09-19-19 @ 05:57) (103/71 - 103/71)  BP(mean): --  RR: 18 (09-19-19 @ 05:57) (18 - 18)  SpO2: --    General: Pt Alert and oriented, NAD, controlled pain.  Dressings C/D/I. No bleeding.  Pulses: 2+ dorsalis pedis pulse. Cap refill < 2 sec.  Sensation: Grossly intact to light touch without deficit.  Motor: + EHL/FHL/TA/GS    A/P: 34y Female POD#3 s/p left tibia IM nail    - Ice and elevation  - Pain Control  - DVT ppx: Lovenox, ASA for discharge  - Continue PT   - Weight bearing status: NWB LLE  - DC home today

## 2019-09-20 ENCOUNTER — CHART COPY (OUTPATIENT)
Age: 34
End: 2019-09-20

## 2019-09-25 ENCOUNTER — OTHER (OUTPATIENT)
Age: 34
End: 2019-09-25

## 2019-09-26 ENCOUNTER — OTHER (OUTPATIENT)
Age: 34
End: 2019-09-26

## 2019-09-26 RX ORDER — OXYCODONE 5 MG/1
5 TABLET ORAL
Qty: 30 | Refills: 0 | Status: DISCONTINUED | COMMUNITY
Start: 2019-09-25 | End: 2019-09-26

## 2019-09-27 ENCOUNTER — OTHER (OUTPATIENT)
Age: 34
End: 2019-09-27

## 2019-10-03 ENCOUNTER — OTHER (OUTPATIENT)
Age: 34
End: 2019-10-03

## 2019-10-03 ENCOUNTER — APPOINTMENT (OUTPATIENT)
Dept: ORTHOPEDIC SURGERY | Facility: CLINIC | Age: 34
End: 2019-10-03
Payer: MEDICAID

## 2019-10-03 DIAGNOSIS — S82.209A UNSPECIFIED FRACTURE OF SHAFT OF UNSPECIFIED TIBIA, INITIAL ENCOUNTER FOR CLOSED FRACTURE: ICD-10-CM

## 2019-10-03 PROCEDURE — 99024 POSTOP FOLLOW-UP VISIT: CPT

## 2019-10-03 PROCEDURE — 73590 X-RAY EXAM OF LOWER LEG: CPT | Mod: LT

## 2019-10-09 ENCOUNTER — MEDICATION RENEWAL (OUTPATIENT)
Age: 34
End: 2019-10-09

## 2019-10-09 ENCOUNTER — TRANSCRIPTION ENCOUNTER (OUTPATIENT)
Age: 34
End: 2019-10-09

## 2019-10-14 ENCOUNTER — CHART COPY (OUTPATIENT)
Age: 34
End: 2019-10-14

## 2019-10-17 ENCOUNTER — OTHER (OUTPATIENT)
Age: 34
End: 2019-10-17

## 2019-10-17 ENCOUNTER — APPOINTMENT (OUTPATIENT)
Dept: ORTHOPEDIC SURGERY | Facility: CLINIC | Age: 34
End: 2019-10-17
Payer: MEDICAID

## 2019-10-17 PROCEDURE — 99024 POSTOP FOLLOW-UP VISIT: CPT

## 2019-10-17 PROCEDURE — 73590 X-RAY EXAM OF LOWER LEG: CPT | Mod: LT

## 2019-10-17 NOTE — HISTORY OF PRESENT ILLNESS
[Healed] : healed [Vascular Intact] : ~T peripheral vascular exam normal [Neuro Intact] : an unremarkable neurological exam [Hardware in Good Position] : hardware in good position [Xray (Date:___)] : [unfilled] Xray -  [No Sign of Infection] : is showing no signs of infection [Adequate Pain Control] : has adequate pain control [Chills] : no chills [Fever] : no fever [Erythema] : not erythematous [Swelling] : not swollen [Discharge] : absent of discharge [Dehiscence] : not dehisced [de-identified] : 35 yo f s/p ORIF left ankle DOS 9/16/19 Complaining of left ankle pain.The patient was in the office a couple of weeks ago and was doing well. However she fell 3 days ago and since has been complaining of pain in her ankle.  She is reports that she has continued NWB in CAm boot.  She has been taking oxycodone as needed with good results.   [de-identified] : ORIF left ankle DOS 9/16/19 [de-identified] : + swelling of ankle, mild tenderness, able to move ankle, FROM of knee [de-identified] : xrays left tibia 2 views: [de-identified] : Patient will rest, ice, elevate,  She was given a rx for Oxycodone for pain, and she will add tylenol and motrin as needed.  She would like to return  to work and feels she is able to perform her duties.  she was given a return to work note as long as she remains NWB LLE and is able to elevate LLE as needed.  she will Call office for any issues or concerns, otherwise she will Continue physical therapy  and return to office as previously scheduled for eval and xray left tibia

## 2019-10-25 ENCOUNTER — OTHER (OUTPATIENT)
Age: 34
End: 2019-10-25

## 2019-10-28 ENCOUNTER — APPOINTMENT (OUTPATIENT)
Dept: ORTHOPEDIC SURGERY | Facility: CLINIC | Age: 34
End: 2019-10-28
Payer: MEDICAID

## 2019-10-28 VITALS — BODY MASS INDEX: 44.16 KG/M2 | WEIGHT: 240 LBS | HEIGHT: 62 IN

## 2019-10-28 PROCEDURE — 99024 POSTOP FOLLOW-UP VISIT: CPT

## 2019-10-29 ENCOUNTER — TRANSCRIPTION ENCOUNTER (OUTPATIENT)
Age: 34
End: 2019-10-29

## 2019-10-29 ENCOUNTER — MEDICATION RENEWAL (OUTPATIENT)
Age: 34
End: 2019-10-29

## 2019-11-05 ENCOUNTER — RX RENEWAL (OUTPATIENT)
Age: 34
End: 2019-11-05

## 2019-11-15 ENCOUNTER — OTHER (OUTPATIENT)
Age: 34
End: 2019-11-15

## 2019-11-27 ENCOUNTER — RX RENEWAL (OUTPATIENT)
Age: 34
End: 2019-11-27

## 2019-12-02 ENCOUNTER — CHART COPY (OUTPATIENT)
Age: 34
End: 2019-12-02

## 2019-12-02 ENCOUNTER — APPOINTMENT (OUTPATIENT)
Dept: ORTHOPEDIC SURGERY | Facility: CLINIC | Age: 34
End: 2019-12-02
Payer: MEDICAID

## 2019-12-02 ENCOUNTER — TRANSCRIPTION ENCOUNTER (OUTPATIENT)
Age: 34
End: 2019-12-02

## 2019-12-02 VITALS
HEART RATE: 87 BPM | WEIGHT: 240 LBS | BODY MASS INDEX: 44.16 KG/M2 | SYSTOLIC BLOOD PRESSURE: 120 MMHG | HEIGHT: 62 IN | DIASTOLIC BLOOD PRESSURE: 78 MMHG

## 2019-12-02 PROCEDURE — 73590 X-RAY EXAM OF LOWER LEG: CPT | Mod: LT

## 2019-12-02 PROCEDURE — 73610 X-RAY EXAM OF ANKLE: CPT | Mod: LT

## 2019-12-02 PROCEDURE — 99024 POSTOP FOLLOW-UP VISIT: CPT

## 2019-12-02 NOTE — HISTORY OF PRESENT ILLNESS
[___ Weeks Post Op] : [unfilled] weeks post op [Doing Well] : is doing well [No Sign of Infection] : is showing no signs of infection [Adequate Pain Control] : has adequate pain control [de-identified] : Intramedullary nail, left tibia fracture.\par  Open reduction with internal fixation of left trimalleolar ankle fracture. DOS: 09/16/19 [de-identified] : The patient has been doing well since her last visit. She continues to have pain and stiffness in her ankle, though the pain in her proximal leg has improved. She has been full weight bearing in the camboot, though does take it off for periods of time when she is in her home. She has also been attending physical therapy. She denies any paresthesias or muscle weakness. No complaints. [de-identified] : 34F with L tibia fx s/p IMN [de-identified] : Physical Exam:\par General: Well appearing, no acute distress, A&O\par Neurologic: A&Ox3, No focal deficits\par Head: NCAT without abrasions, lacerations, or ecchymosis to head, face, or scalp\par Respiratory: Equal chest wall expansion bilaterally, no accessory muscle use\par Lymphatic: No lymphadenopathy palpated\par Skin: Warm and dry\par Psychiatric: Normal mood and affect\par \par LLE\par incisions clean/dry/intact, no erythema\par SILT\par extremity warm/well perfused\par Fires EHL/FHL/GS/TA\par some pain/stiffness noted with passive ankle ROM [de-identified] : x-rays demonstrate L tibia shaft fracture s/p IMN in acceptable alignment, with appropriate callous formation [de-identified] : She is doing well today, and while she still has some ankle pain, it is progressively improving. She has discontinued taking narcotics, and takes ibuprofin when the pain flares up. X-rays were obtained today, and demonstrate a healing tibial shaft fx s/p IMN in acceptable alignment. She may continue to wear the camboot as needed for comfort/security, though advised her to begin weaning away from it. She shall continue physical therapy, with focus on stretching/strengthening and home exercises. She is return to the clinic in 6-8 weeks, at which time we will obtain new xrays to ensure continued healing, and also re-evaluate her general progress. All questions were answered, patient understands and in agreement with above plan.\par \par Orthopaedic Trauma Surgeon Addendum:\par \par I agree with the above resident physician note.  Appropriate imaging has been reviewed and the plan adjusted as needed.\par \par Alex Pérez MD\par Orthopaedic Trauma Surgeon\par [facility]\par [facility] [place]

## 2019-12-09 ENCOUNTER — TRANSCRIPTION ENCOUNTER (OUTPATIENT)
Age: 34
End: 2019-12-09

## 2019-12-09 ENCOUNTER — MED ADMIN CHARGE (OUTPATIENT)
Age: 34
End: 2019-12-09

## 2019-12-24 ENCOUNTER — RX RENEWAL (OUTPATIENT)
Age: 34
End: 2019-12-24

## 2020-01-09 ENCOUNTER — MEDICATION RENEWAL (OUTPATIENT)
Age: 35
End: 2020-01-09

## 2020-01-16 ENCOUNTER — TRANSCRIPTION ENCOUNTER (OUTPATIENT)
Age: 35
End: 2020-01-16

## 2020-01-22 ENCOUNTER — APPOINTMENT (OUTPATIENT)
Dept: ORTHOPEDIC SURGERY | Facility: CLINIC | Age: 35
End: 2020-01-22

## 2020-02-06 ENCOUNTER — TRANSCRIPTION ENCOUNTER (OUTPATIENT)
Age: 35
End: 2020-02-06

## 2020-05-12 ENCOUNTER — RX RENEWAL (OUTPATIENT)
Age: 35
End: 2020-05-12

## 2020-05-18 ENCOUNTER — APPOINTMENT (OUTPATIENT)
Dept: ORTHOPEDIC SURGERY | Facility: CLINIC | Age: 35
End: 2020-05-18
Payer: MEDICAID

## 2020-05-18 VITALS
HEART RATE: 85 BPM | BODY MASS INDEX: 44.16 KG/M2 | SYSTOLIC BLOOD PRESSURE: 140 MMHG | HEIGHT: 62 IN | WEIGHT: 240 LBS | DIASTOLIC BLOOD PRESSURE: 96 MMHG

## 2020-05-18 VITALS — TEMPERATURE: 98.1 F

## 2020-05-18 DIAGNOSIS — S82.209D: ICD-10-CM

## 2020-05-18 DIAGNOSIS — S82.892D OTHER FRACTURE OF LEFT LOWER LEG, SUBSEQUENT ENCOUNTER FOR CLOSED FRACTURE WITH ROUTINE HEALING: ICD-10-CM

## 2020-05-18 PROCEDURE — 73590 X-RAY EXAM OF LOWER LEG: CPT | Mod: LT

## 2020-05-18 PROCEDURE — 73610 X-RAY EXAM OF ANKLE: CPT | Mod: LT

## 2020-05-18 PROCEDURE — 99214 OFFICE O/P EST MOD 30 MIN: CPT

## 2020-05-18 NOTE — DISCUSSION/SUMMARY
[de-identified] : She is doing well in general s/p IMN tibia, ORIF left trimalleolar fx 8 months post op.  Intermittent use of NSAIDS, ice, elevation, compression as intermittent edea and discomfort can be normal.  Physical therapy for decrease pain, increase function.  Follow up 8 weeks and prn continued pain. If pain continues consider referral to ankle specialty.\par \par \par Orthopaedic Trauma Surgeon Addendum:\par \par I agree with the above physician assistant note.  Appropriate imaging has been reviewed and the plan adjusted as needed.\par \par Conservative measures of treatment include rest until asymptomatic, activity avoidance, NSAID's PRN, acetaminophen, application to ice to the area 2-3x daily for 20 minutes, with gradual return to activities.\par \par Alex Pérez MD\par Orthopaedic Trauma Surgeon\par [facility]\par [facility] [place]\par

## 2020-05-18 NOTE — PHYSICAL EXAM
[de-identified] : General: Well appearing, no acute distress, A&O\par Neurologic: A&Ox3, No focal deficits\par Head: NCAT without abrasions, lacerations, or ecchymosis to head, face, or scalp\par Respiratory: Equal chest wall expansion bilaterally, no accessory muscle use\par Lymphatic: No lymphadenopathy palpated\par Skin: Warm and dry\par Psychiatric: Normal mood and affect\par \par LLE\par incisions clean/dry/intact, no erythema\par SILT\par extremity warm/well perfused\par Fires EHL/FHL/GS/TA\par some pain/stiffness, medial ankle discomfort noted with passive ankle ROM.  TTP deltoid ligamentous area.  Mild edema lateral malleolus LLE.  [de-identified] :  x-rays demonstrate L tibia shaft fracture s/p IMN in acceptable alignment, with appropriate callous formation. \par \par ORIF left ankle/tibia well maintained. \par

## 2020-05-18 NOTE — HISTORY OF PRESENT ILLNESS
[de-identified] : The patient has been doing well since her last visit. She continues to have pain and stiffness,swelling in medial aspect her ankle, though the pain in her proximal leg has improved. She has been full weight bearing in regular footwear. She has also been doing home  physical therapy without much effect. She denies any paresthesias or muscle weakness. Continued complaints of intermittent inability to weight bear, difficulty ascending steps. \par \par  [2] : a current pain level of 2/10 [Lifting] : worsened by lifting [Bending] : worsened by bending [Recumbency] : relieved by recumbency [de-identified] : aching [Rest] : relieved by rest

## 2020-05-30 ENCOUNTER — TRANSCRIPTION ENCOUNTER (OUTPATIENT)
Age: 35
End: 2020-05-30

## 2020-07-13 ENCOUNTER — APPOINTMENT (OUTPATIENT)
Dept: ORTHOPEDIC SURGERY | Facility: CLINIC | Age: 35
End: 2020-07-13

## 2020-07-21 ENCOUNTER — TRANSCRIPTION ENCOUNTER (OUTPATIENT)
Age: 35
End: 2020-07-21

## 2021-01-26 NOTE — PATIENT PROFILE ADULT - FUNCTIONAL SCREEN CURRENT LEVEL: BATHING, MLM
Detail Level: Zone
Plan: Recommend minimum of SPF 30+ daily to the sun exposed areas. Reapply every 2 hours.
0 = independent

## 2021-04-21 ENCOUNTER — APPOINTMENT (OUTPATIENT)
Dept: INTERNAL MEDICINE | Facility: CLINIC | Age: 36
End: 2021-04-21

## 2021-04-21 RX ORDER — CLONAZEPAM 1 MG/1
1 TABLET ORAL
Qty: 14 | Refills: 0 | Status: DISCONTINUED | COMMUNITY
Start: 2019-06-12 | End: 2021-04-21

## 2021-05-15 NOTE — ED ADULT NURSE NOTE - CAS ELECT INFOMATION PROVIDED
Patient: Wendy Horne  * No surgery found *  Anesthesia type: No value filed.    Patient location: Labor and Delivery  Last vitals:   Vitals:    05/15/21 1300   BP: 119/77   Pulse: 77   Resp: 16   Temp: 98.6 °F (37 °C)   SpO2: 99%     Level of consciousness: awake, alert and oriented    Post-anesthesia pain: adequate analgesia  Airway patency: patent  Respiratory: unassisted  Cardiovascular: stable and blood pressure at baseline  Hydration: euvolemic    Anesthetic complications: no      DC instructions

## 2021-08-06 NOTE — ED ADULT TRIAGE NOTE - ACCOMPANIED BY
EMT/paramedic Helical Rim Advancement Flap Text: The defect edges were debeveled with a #15 blade scalpel.  Given the location of the defect and the proximity to free margins (helical rim) a double helical rim advancement flap was deemed most appropriate.  Using a sterile surgical marker, the appropriate advancement flaps were drawn incorporating the defect and placing the expected incisions between the helical rim and antihelix where possible.  The area thus outlined was incised through and through with a #15 scalpel blade.  With a skin hook and iris scissors, the flaps were gently and sharply undermined and freed up.

## 2022-01-28 NOTE — ED PROVIDER NOTE - NS_EDPROVIDERDISPOUSERTYPE_ED_A_ED
[facility] Infectious Disease Consult Note    Prem Segura Patient Status:  Inpatient    1972 MRN [id number]   Location [facility] INTENSIVE CARE UNIT Attending Heath Molina,    Hosp Day # 1 PCP Verify Pcp     Reason for Consultation: covid pneumonia    ASSESSMENT:   - covid pneumonia  - acute hypoxic resp failure intubated on  at OSH  - elevated d-dimer. CTA chest at OSH neg for PE  - obesity  - DM    PLAN:   - continue steroids per pulmonary  - doubt benefit from remdesivir as patient intubated  - recommend tocilizumab x1. D/w ICU team   and agreeable  - abx with zosyn and doxycycline  - continue anticoagulation per other services  - continue covid isolation  - monitor LDH, ferritin, d-dimer, CRP  - monitor O2 sats and wean O2 as able  - monitor clinically, follow temps/labs  - d/w patient's wife over phone  - d/w ICU team and RN  - overall prognosis guarded  - thank you for the consult, will continue to follow      History of Present Illness:   Prem Segura is a 49 year old male with PMHx of DM, obesity, bipolar d/o who presented to OSH on  with cough and sob. The patient is currently intubated and unable to provide history and obtained from chart and from wife. He developed symptoms on  with cough, sob, rhinorrhea, myalgias. He had nausea, vomiting and fevers. He is not vaccinated to covid. He had covid test done on  and positive. He had worsening symptoms and went to [facility] on  and admitted. Found to have hypoxia with covid pneumonia. He was intially placed on 4L NC. Started on steroids and lovenox. He had elevated d-dimer and had CTA chest that was neg for PE with bilateral covid pneumonia. He had worsening hypoxic resp failure and needed intubation. he was transferred to [facility] for higher level of care and no ICU beds at OSH and surrounding area. When seen this AM he is currently intubated and sedated on vent. FiO2 100%.     Past Medical  History  Past Medical History:   Diagnosis Date   • Bipolar disorder (CMS/[facility])    • Diabetes mellitus (CMS/[facility])    • Difficult intubation    • Essential (primary) hypertension    • Gastroesophageal reflux disease    • High cholesterol         Surgical History  No past surgical history on file.     Social History  Social History     Tobacco Use   • Smoking status: Never Smoker   • Smokeless tobacco: Never Used   Vaping Use   • Vaping Use: never used   Substance Use Topics   • Alcohol use: Yes     Alcohol/week: 1.0 standard drink     Types: 1 Standard drinks or equivalent per week     Comment: rare   • Drug use: Never       Family History  No family history on file.    Allergies:  ALLERGIES:   Allergen Reactions   • Codeine Other (See Comments)       Medications:  Current Facility-Administered Medications   Medication Dose Route Frequency Provider Last Rate Last Admin   • escitalopram (LEXAPRO) tablet 20 mg  20 mg Per NG tube Daily aSnjana Durbin CNP   20 mg at 01/28/22 0841   • OXcarbazepine (TRILEPTAL) tablet 1,200 mg  1,200 mg Per NG tube 2 times per day Sanjana Durbin CNP   1,200 mg at 01/28/22 0840   • lamoTRIgine (LaMICtal) tablet 200 mg  200 mg Per NG tube BID Sanjana Durbin CNP   200 mg at 01/28/22 0841   • toCILIzumab (ACTEMRA) 800 mg in sodium chloride 0.9 % 100 mL total volume IVPB  800 mg Intravenous Once Devin Jenkins MD       • chlorhexidine gluconate (PERIDEX) 0.12 % solution 15 mL  15 mL Swish & Spit 2 times per day Sanjana Durbin CNP   15 mL at 01/28/22 0840    And   • chlorhexidine gluconate (PERIDEX) 0.12 % solution 15 mL  15 mL Swish & Spit PRN Sanjana Durbin CNP       • fentaNYL (SUBLIMAZE) injection  mcg   mcg Intravenous Q15 Min PRN Sanjana Durbin CNP       • fentaNYL (SUBLIMAZE) injection  mcg   mcg Intravenous Q1H PRN Sanjana Durbin CNP       • petrolatum (white)-mineral oil (LUBRIFRESH PM) ophthalmic ointment 1 application  1 application Both  Eyes 6 times per day Sanjana Krusemark, CNP   1 application at 01/28/22 0839   • sodium chloride 0.9 % flush bag 25 mL  25 mL Intravenous PRN [facility] Tymark, CNP       • sodium chloride (PF) 0.9 % injection 2 mL  2 mL Intracatheter 2 times per day [facility] Krusemark, CNP   2 mL at 01/28/22 0839   • sodium chloride 0.9% infusion   Intravenous Continuous PRN [facility] Krusemark, CNP       • sodium chloride 0.9% infusion   Intravenous Continuous PRN [facility] Krusemark, CNP       • docusate sodium-sennosides (SENOKOT S) 50-8.6 MG 2 tablet  2 tablet Oral QHS [facility] Krchandnimark, CNP   2 tablet at 01/27/22 2300   • propofol (DIPRIVAN) infusion  0-50 mcg/kg/min (Dosing Weight) Intravenous Continuous [facility] Krusemark, CNP 47.5 mL/hr at 01/28/22 0941 50 mcg/kg/min at 01/28/22 0941   • fentaNYL (SUBLIMAZE) 2,500 mcg/250 mL in sodium chloride 0.9 % infusion  0-150 mcg/hr Intravenous Continuous [facility] Krusemark, CNP 15 mL/hr at 01/28/22 0941 150 mcg/hr at 01/28/22 0941   • NORepinephrine (LEVOPHED) 8 mg/250 mL in sodium chloride 0.9 % infusion  0-30 mcg/min Intravenous Continuous [facility] Krusemark, CNP       • dexamethasone (PF) (DECADRON) injection 10 mg  10 mg Intravenous Daily [facility] Moralesusemark, CNP   10 mg at 01/28/22 0840   • enoxaparin (LOVENOX) injection 40 mg  40 mg Subcutaneous Q12H [facility] Krusemark, CNP   40 mg at 01/28/22 0202   • pantoprazole (PROTONIX INJECT) injection 40 mg  40 mg Intravenous Nightly [facility] Krusemark, CNP   40 mg at 01/28/22 0203   • pravastatin (PRAVACHOL) tablet 40 mg  40 mg Oral Nightly [facility] Krusemark, CNP   40 mg at 01/28/22 0203   • busPIRone (BUSPAR) tablet 10 mg  10 mg Oral 2 times per day [facility] Krusemark, CNP   10 mg at 01/28/22 0841   • celecoxib (CeleBREX) capsule 200 mg  200 mg Oral BID WC Sanjana Durbin CNP   200 mg at 01/28/22 0840   • dextrose 50 % injection 25 g  25 g Intravenous PRN Sanjana Durbin CNP       • dextrose 50 % injection 12.5 g  12.5 g Intravenous PRN  Sanjana Krusemark, CNP       • glucagon (GLUCAGEN) injection 1 mg  1 mg Intramuscular PRN Sanjana Durbin CNP       • dextrose (GLUTOSE) 40 % gel 15 g  15 g Oral PRN Sanjana Durbin CNP       • dextrose (GLUTOSE) 40 % gel 30 g  30 g Oral PRN Sanjana Durbin CNP       • insulin regular (human) (HumuLIN R, NovoLIN R) Correction Dose   Subcutaneous 4 times per day Sanjana Durbin CNP   2 Units at 01/28/22 0202        Review of Systems    Unable to obtain, patient intubated    Physical Exam:   Weight:   Wt Readings from Last 3 Encounters:   01/28/22 (!) 151.1 kg (333 lb 1.8 oz)   , Weight change:   Height:   Ht Readings from Last 3 Encounters:   01/27/22 6' (1.829 m)          Vital Last Value 24 Hour Range   Temperature 98.8 °F (37.1 °C) (01/28/22 0800) Temp  Min: 98.2 °F (36.8 °C)  Max: 99.7 °F (37.6 °C)   Pulse 85 (01/28/22 1100) Pulse  Min: 85  Max: 107   Respiratory (!) 24 (01/28/22 1100) Resp  Min: 20  Max: 30   Non-Invasive  Blood Pressure 120/63 (01/28/22 1100) BP  Min: 94/60  Max: 121/75   Pulse Oximetry 91 % (01/28/22 1110) SpO2  Min: 89 %  Max: 97 %   Arterial   Blood Pressure   No data recorded       GENERAL: intubated and sedated on vent  LYMPH NODES: no cervical adenopathy, no supraclavicular adenopathy, no axillary adenopathy, no inguinal adenopathy  SKIN: normal color, normal texture, normal turgor, no skin rashes, no atypical appearing skin lesions, and no bruises  HEENT: PERRLA, no icterus, no CLAN, supple  CARDIOVASCULAR: Regular rate and rhythm, no murmurs, rubs or gallups  LUNGS: lungs are clear to auscultation with normal inspiratory/expiratory sounds, no rales, no rhonchi, and no wheezes  ABDOMEN: abdomen is soft, normal active bowel sounds, nontender, and without masses  GENITOURINARY: deferred  BACK: inspection shows no curvature, no discomfort to palpation of the midline, and no costovertebral angle discomfort to palpation  NEUROLOGIC: sedated on vent  EXTREMITIES: no clubbing, no  cyanosis, no edema    Results:  Labs:     CBC  Recent Labs     01/28/22  0022 01/28/22  0403   WBC 5.5 5.3   HGB 12.2* 11.9*   HCT 38.2* 37.4*    167   MCV 94.1 94.7       CMP  Recent Labs   Lab 01/28/22  0403 01/28/22  0022   SODIUM 144 141   POTASSIUM 4.2 4.2   CHLORIDE 105 104   CO2 33* 32   BUN 30* 29*   CREATININE 0.96 0.89   GLUCOSE 137* 158*   CALCIUM 8.6 8.6     Recent Labs   Lab 01/28/22  0403 01/28/22  0022   ALBUMIN 2.2* 2.3*   BILIRUBIN 2.0* 1.8*   AST 47* 44*   GPT 29 28       Imaging:  CXR 1/28  IMPRESSION:  Patchy ground glass opacities randomly distributed throughout both lungs,  concerning for atypical/viral infection with differential including  COVID-19.  Endotracheal tube in expected position.    Microbiology:    No results found for: CULTUREBLO, CULTUREBLO, CULTUREAER, CULTUREANAER, CULTUREWOUND, CULTUREURINE, CULTURESPU, CULTURETHR, CULTUREGEN, CSFCLT, CULTUREFUN, CULTUREFUN     Microbiology Results     None          Devin Jenkins MD  [facility], [facility]  [phone]  1/28/2022  11:30 AM   Attending Attestation (For Attendings USE Only)...

## 2022-05-09 NOTE — ED CLERICAL - NS ED CLERK UNITS
Postpartum visit      Vianca Jules is a 22 y.o.  s/p Vaginal, Spontaneous on 2022 at 40w0d secondary to EIOL who presents today for postpartum check.  The patient states she is doing well.  Patient denies postpartum depression.  Menstrual cycles have not resumed.  Bottlefeeding.  Declines contraception.  She has not resumed sexual intercourse.  Denies bowel or bladder issues.    PHYSICAL EXAM:    Postpartum Depression Screening Questionnaire: 2    IMPRESSION/PLAN: Vianca Jules is a 22 y.o.  s/p Vaginal, Spontaneous on .  Doing well.  - Recovered nicely from her delivery  - Contraception: Declined  - RTC 4 weeks for final PP visit w/ pap smear    This document has been electronically signed by Alexa Enamorado MD on May 9, 2022 11:21 CDT.    This visit has been rescheduled as a phone visit to comply with patient safety concerns in accordance with CDC recommendations.  Total time of discussion was 9 minutes.  The use of a telephone visit has been reviewed with the patient and verbal informed consent has been obtained.   [id number]

## 2022-05-11 ENCOUNTER — NON-APPOINTMENT (OUTPATIENT)
Age: 37
End: 2022-05-11

## 2022-05-11 ENCOUNTER — APPOINTMENT (OUTPATIENT)
Dept: INTERNAL MEDICINE | Facility: CLINIC | Age: 37
End: 2022-05-11
Payer: MEDICAID

## 2022-05-11 VITALS
DIASTOLIC BLOOD PRESSURE: 78 MMHG | BODY MASS INDEX: 52.68 KG/M2 | WEIGHT: 288 LBS | HEART RATE: 72 BPM | SYSTOLIC BLOOD PRESSURE: 140 MMHG

## 2022-05-11 DIAGNOSIS — Z00.00 ENCOUNTER FOR GENERAL ADULT MEDICAL EXAMINATION W/OUT ABNORMAL FINDINGS: ICD-10-CM

## 2022-05-11 PROCEDURE — 99395 PREV VISIT EST AGE 18-39: CPT | Mod: 25

## 2022-05-11 PROCEDURE — 93000 ELECTROCARDIOGRAM COMPLETE: CPT

## 2022-05-11 RX ORDER — OXYCODONE 5 MG/1
5 TABLET ORAL
Qty: 30 | Refills: 0 | Status: DISCONTINUED | COMMUNITY
Start: 2019-09-26 | End: 2022-05-11

## 2022-05-11 RX ORDER — PAROXETINE HYDROCHLORIDE 40 MG/1
40 TABLET, FILM COATED ORAL
Qty: 30 | Refills: 4 | Status: DISCONTINUED | COMMUNITY
Start: 2019-06-12 | End: 2022-05-11

## 2022-05-11 RX ORDER — MELOXICAM 7.5 MG/1
7.5 TABLET ORAL TWICE DAILY
Qty: 28 | Refills: 0 | Status: DISCONTINUED | COMMUNITY
Start: 2020-01-17 | End: 2022-05-11

## 2022-05-11 RX ORDER — OXYCODONE 5 MG/1
5 TABLET ORAL DAILY
Qty: 5 | Refills: 0 | Status: DISCONTINUED | COMMUNITY
Start: 2019-11-15 | End: 2022-05-11

## 2022-05-11 RX ORDER — OXYCODONE 5 MG/1
5 TABLET ORAL EVERY 8 HOURS
Qty: 15 | Refills: 0 | Status: DISCONTINUED | COMMUNITY
Start: 2019-10-17 | End: 2022-05-11

## 2022-05-11 RX ORDER — OXYCODONE 5 MG/1
5 TABLET ORAL EVERY 8 HOURS
Qty: 15 | Refills: 0 | Status: DISCONTINUED | COMMUNITY
Start: 2019-10-25 | End: 2022-05-11

## 2022-05-11 RX ORDER — CHOLECALCIFEROL (VITAMIN D3) 1250 MCG
1.25 MG CAPSULE ORAL
Qty: 12 | Refills: 0 | Status: DISCONTINUED | COMMUNITY
Start: 2019-09-10 | End: 2022-05-11

## 2022-05-11 RX ORDER — OXYCODONE 5 MG/1
5 TABLET ORAL EVERY 6 HOURS
Qty: 20 | Refills: 0 | Status: DISCONTINUED | COMMUNITY
Start: 2019-10-03 | End: 2022-05-11

## 2022-05-11 NOTE — HEALTH RISK ASSESSMENT
[Excellent] : ~his/her~ current health as excellent [Never] : Never [No] : No [No falls in past year] : Patient reported no falls in the past year [0] : 2) Feeling down, depressed, or hopeless: Not at all (0) [PHQ-2 Negative - No further assessment needed] : PHQ-2 Negative - No further assessment needed [[id number]] : 0 [HIV Test offered] : HIV Test offered [Hepatitis C test offered] : Hepatitis C test offered [Change in mental status noted] : No change in mental status noted [Language] : denies difficulty with language [Behavior] : denies difficulty with behavior [Learning/Retaining New Information] : denies difficulty learning/retaining new information [Handling Complex Tasks] : denies difficulty handling complex tasks [Reasoning] : denies difficulty with reasoning [Spatial Ability and Orientation] : denies difficulty with spatial ability and orientation [None] : None [With Significant Other] : lives with significant other [Employed] : employed [High School] : high school [# Of Children ___] : has [unfilled] children [] :  [Sexually Active] : sexually active [High Risk Behavior] : high risk behavior [Fully functional (using the telephone, shopping, preparing meals, housekeeping, doing laundry, using] : Fully functional and needs no help or supervision to perform IADLs (using the telephone, shopping, preparing meals, housekeeping, doing laundry, using transportation, managing medications and managing finances) [Fully functional (bathing, dressing, toileting, transferring, walking, feeding)] : Fully functional (bathing, dressing, toileting, transferring, walking, feeding) [Reports changes in hearing] : Reports no changes in hearing [Reports changes in vision] : Reports no changes in vision [Reports normal functional visual acuity (ie: able to read med bottle)] : Reports poor functional visual acuity.  [Reports changes in dental health] : Reports no changes in dental health [Smoke Detector] : smoke detector [Carbon Monoxide Detector] : carbon monoxide detector [Guns at Home] : no guns at home [Safety elements used in home] : safety elements used in home [Seat Belt] :  uses seat belt [Sunscreen] : does not use sunscreen [Travel to Developing Areas] : does not  travel to developing areas [TB Exposure] : is not being exposed to tuberculosis [AdvancecareDate] : 5/11/22

## 2022-05-11 NOTE — HISTORY OF PRESENT ILLNESS
[FreeTextEntry1] : For CPE [de-identified] : For CPE\par morbid obesity \par trying to lose weight\par has been well\par has had gastrilc bypass in the past

## 2022-05-11 NOTE — ASSESSMENT
[FreeTextEntry1] : for cpe\par check labs\par ekg normal\par work on weightloss and fertility\par followup as needed

## 2022-05-12 ENCOUNTER — NON-APPOINTMENT (OUTPATIENT)
Age: 37
End: 2022-05-12

## 2022-05-12 DIAGNOSIS — K76.0 FATTY (CHANGE OF) LIVER, NOT ELSEWHERE CLASSIFIED: ICD-10-CM

## 2022-05-12 LAB
25(OH)D3 SERPL-MCNC: 32.4 NG/ML
ALBUMIN SERPL ELPH-MCNC: 4.2 G/DL
ALP BLD-CCNC: 182 U/L
ALT SERPL-CCNC: 50 U/L
ANION GAP SERPL CALC-SCNC: 16 MMOL/L
APPEARANCE: CLEAR
AST SERPL-CCNC: 43 U/L
BASOPHILS # BLD AUTO: 0.04 K/UL
BASOPHILS NFR BLD AUTO: 0.5 %
BILIRUB SERPL-MCNC: 0.2 MG/DL
BILIRUBIN URINE: NEGATIVE
BLOOD URINE: NEGATIVE
BUN SERPL-MCNC: 16 MG/DL
CALCIUM SERPL-MCNC: 9.4 MG/DL
CHLORIDE SERPL-SCNC: 101 MMOL/L
CHOLEST SERPL-MCNC: 174 MG/DL
CO2 SERPL-SCNC: 21 MMOL/L
COLOR: NORMAL
COVID-19 SPIKE DOMAIN ANTIBODY INTERPRETATION: POSITIVE
CREAT SERPL-MCNC: 0.75 MG/DL
CREAT SPEC-SCNC: 78 MG/DL
EGFR: 104 ML/MIN/1.73M2
EOSINOPHIL # BLD AUTO: 0 K/UL
EOSINOPHIL NFR BLD AUTO: 0 %
ESTIMATED AVERAGE GLUCOSE: 120 MG/DL
GLUCOSE QUALITATIVE U: NEGATIVE
GLUCOSE SERPL-MCNC: 94 MG/DL
HBA1C MFR BLD HPLC: 5.8 %
HCT VFR BLD CALC: 35.5 %
HCV AB SER QL: NONREACTIVE
HCV S/CO RATIO: 0.15 S/CO
HDLC SERPL-MCNC: 57 MG/DL
HGB BLD-MCNC: 10.3 G/DL
HIV1+2 AB SPEC QL IA.RAPID: NONREACTIVE
IMM GRANULOCYTES NFR BLD AUTO: 0.2 %
KETONES URINE: NEGATIVE
LDLC SERPL CALC-MCNC: 82 MG/DL
LEUKOCYTE ESTERASE URINE: NEGATIVE
LYMPHOCYTES # BLD AUTO: 2.89 K/UL
LYMPHOCYTES NFR BLD AUTO: 33.8 %
MAN DIFF?: NORMAL
MCHC RBC-ENTMCNC: 21.5 PG
MCHC RBC-ENTMCNC: 29 GM/DL
MCV RBC AUTO: 74.3 FL
MICROALBUMIN 24H UR DL<=1MG/L-MCNC: <1.2 MG/DL
MICROALBUMIN/CREAT 24H UR-RTO: NORMAL MG/G
MONOCYTES # BLD AUTO: 0.92 K/UL
MONOCYTES NFR BLD AUTO: 10.8 %
NEUTROPHILS # BLD AUTO: 4.68 K/UL
NEUTROPHILS NFR BLD AUTO: 54.7 %
NITRITE URINE: NEGATIVE
NONHDLC SERPL-MCNC: 117 MG/DL
PH URINE: 6
PLATELET # BLD AUTO: 466 K/UL
POTASSIUM SERPL-SCNC: 4.3 MMOL/L
PROT SERPL-MCNC: 7.1 G/DL
PROTEIN URINE: NEGATIVE
RBC # BLD: 4.78 M/UL
RBC # FLD: 18.3 %
SARS-COV-2 AB SERPL IA-ACNC: >250 U/ML
SODIUM SERPL-SCNC: 138 MMOL/L
SPECIFIC GRAVITY URINE: 1.02
T4 FREE SERPL-MCNC: 0.9 NG/DL
TRIGL SERPL-MCNC: 173 MG/DL
TSH SERPL-ACNC: 2.07 UIU/ML
UROBILINOGEN URINE: NORMAL
WBC # FLD AUTO: 8.55 K/UL

## 2022-07-19 ENCOUNTER — APPOINTMENT (OUTPATIENT)
Dept: AFTER HOURS CARE | Facility: EMERGENCY ROOM | Age: 37
End: 2022-07-19

## 2022-07-19 DIAGNOSIS — K08.89 OTHER SPECIFIED DISORDERS OF TEETH AND SUPPORTING STRUCTURES: ICD-10-CM

## 2022-07-19 DIAGNOSIS — K04.7 PERIAPICAL ABSCESS W/OUT SINUS: ICD-10-CM

## 2022-07-19 PROCEDURE — 99204 OFFICE O/P NEW MOD 45 MIN: CPT | Mod: 95

## 2022-07-19 RX ORDER — AMOXICILLIN 500 MG/1
500 CAPSULE ORAL 3 TIMES DAILY
Qty: 21 | Refills: 0 | Status: ACTIVE | COMMUNITY
Start: 2022-07-19 | End: 1900-01-01

## 2022-07-19 RX ORDER — PANTOPRAZOLE 40 MG/1
40 TABLET, DELAYED RELEASE ORAL DAILY
Qty: 10 | Refills: 0 | Status: ACTIVE | COMMUNITY
Start: 2022-07-19 | End: 1900-01-01

## 2022-07-19 NOTE — HISTORY OF PRESENT ILLNESS
[FreeTextEntry8] : 3 days L upper tooth pain + swelling, not improved after nsaids. Notes now has some swelling of her L cheek. Some\par pain c mastication on that side. No n/v. No f/c. No d/c. No difficulty breathing/swallowing. No neck pain or pain c\par neck ROM.\par PMH denies\par PSH: gastric bypass, choley\par NKDA

## 2022-07-19 NOTE — PLAN
[FreeTextEntry1] : Motrin 400 TID no more than 5 days\par Protonix 40 QD x 10 days given gastric bypass\par APAP PRN\par Amox 500 TID x 7

## 2022-07-19 NOTE — ASSESSMENT
[FreeTextEntry1] : No e/o ANUG or obvious periapical abscess on exam. Some gingival swelling/discoloration at upper L molars.  Very slight swelling at L cheek relative to R without erythema to skin\par \par Likely gingivostomatitis of L upper molars

## 2022-08-01 ENCOUNTER — TRANSCRIPTION ENCOUNTER (OUTPATIENT)
Age: 37
End: 2022-08-01

## 2022-08-01 DIAGNOSIS — B37.3 CANDIDIASIS OF VULVA AND VAGINA: ICD-10-CM

## 2023-02-22 NOTE — ED PROCEDURE NOTE - PROCEDURE DATE TIME, MLM
15-Sep-2019 14:02 If you are a smoker, it is important for your health to stop smoking. Please be aware that second hand smoke is also harmful.

## 2023-04-21 ENCOUNTER — TRANSCRIPTION ENCOUNTER (OUTPATIENT)
Age: 38
End: 2023-04-21

## 2023-04-24 ENCOUNTER — TRANSCRIPTION ENCOUNTER (OUTPATIENT)
Age: 38
End: 2023-04-24

## 2023-06-09 ENCOUNTER — APPOINTMENT (OUTPATIENT)
Dept: INTERNAL MEDICINE | Facility: CLINIC | Age: 38
End: 2023-06-09
Payer: MEDICAID

## 2023-06-09 VITALS
HEART RATE: 78 BPM | BODY MASS INDEX: 53.55 KG/M2 | DIASTOLIC BLOOD PRESSURE: 82 MMHG | WEIGHT: 291 LBS | HEIGHT: 62 IN | SYSTOLIC BLOOD PRESSURE: 132 MMHG

## 2023-06-09 DIAGNOSIS — E66.01 MORBID (SEVERE) OBESITY DUE TO EXCESS CALORIES: ICD-10-CM

## 2023-06-09 DIAGNOSIS — Z01.818 ENCOUNTER FOR OTHER PREPROCEDURAL EXAMINATION: ICD-10-CM

## 2023-06-09 PROCEDURE — 99214 OFFICE O/P EST MOD 30 MIN: CPT

## 2023-06-09 NOTE — RESULTS/DATA
[] : results reviewed [de-identified] : normal [de-identified] : normal [de-identified] : normal [de-identified] : nsr no acute changes

## 2023-06-09 NOTE — HISTORY OF PRESENT ILLNESS
[No Pertinent Cardiac History] : no history of aortic stenosis, atrial fibrillation, coronary artery disease, recent myocardial infarction, or implantable device/pacemaker [Aortic Stenosis] : no aortic stenosis [Atrial Fibrillation] : no atrial fibrillation [Coronary Artery Disease] : no coronary artery disease [Recent Myocardial Infarction] : no recent myocardial infarction [Implantable Device/Pacemaker] : no implantable device/pacemaker [No Pertinent Pulmonary History] : no history of asthma, COPD, sleep apnea, or smoking [Asthma] : no asthma [COPD] : no COPD [Sleep Apnea] : no sleep apnea [Smoker] : not a smoker [No Adverse Anesthesia Reaction] : no adverse anesthesia reaction in self or family member [Family Member] : no family member with adverse anesthesia reaction/sudden death [Self] : no previous adverse anesthesia reaction [Chronic Anticoagulation] : no chronic anticoagulation [Chronic Kidney Disease] : no chronic kidney disease [FreeTextEntry1] : Gastric Bypass Revision [FreeTextEntry2] : 6/14/23 [FreeTextEntry3] : Dr. Perez [FreeTextEntry4] : Patient with morbid obesity who will undergo gastric bypass revison.

## 2023-06-09 NOTE — ASSESSMENT
[Patient Optimized for Surgery] : Patient optimized for surgery [No Further Testing Recommended] : no further testing recommended [FreeTextEntry7] : no nsaid

## 2023-06-16 ENCOUNTER — NON-APPOINTMENT (OUTPATIENT)
Age: 38
End: 2023-06-16

## 2023-07-17 ENCOUNTER — RX RENEWAL (OUTPATIENT)
Age: 38
End: 2023-07-17

## 2023-07-17 RX ORDER — FLUCONAZOLE 150 MG/1
150 TABLET ORAL DAILY
Qty: 1 | Refills: 3 | Status: ACTIVE | COMMUNITY
Start: 2022-08-01 | End: 1900-01-01

## 2023-07-19 ENCOUNTER — TRANSCRIPTION ENCOUNTER (OUTPATIENT)
Age: 38
End: 2023-07-19

## 2023-09-14 ENCOUNTER — EMERGENCY (EMERGENCY)
Facility: HOSPITAL | Age: 38
LOS: 1 days | Discharge: HOME | End: 2023-09-14
Attending: EMERGENCY MEDICINE
Payer: COMMERCIAL

## 2023-09-14 VITALS
HEIGHT: 63 IN | DIASTOLIC BLOOD PRESSURE: 82 MMHG | SYSTOLIC BLOOD PRESSURE: 118 MMHG | WEIGHT: 259.93 LBS | TEMPERATURE: 98 F | RESPIRATION RATE: 20 BRPM | OXYGEN SATURATION: 97 % | HEART RATE: 98 BPM

## 2023-09-14 LAB
APPEARANCE UR: ABNORMAL
BACTERIA # UR AUTO: ABNORMAL
BILIRUB UR-MCNC: ABNORMAL
COLOR SPEC: ABNORMAL
DIFF PNL FLD: NEGATIVE — SIGNIFICANT CHANGE UP
EPI CELLS # UR: SIGNIFICANT CHANGE UP
GLUCOSE UR QL: NEGATIVE MG/DL — SIGNIFICANT CHANGE UP
HCG UR QL: POSITIVE
KETONES UR-MCNC: ABNORMAL
LEUKOCYTE ESTERASE UR-ACNC: ABNORMAL
NITRITE UR-MCNC: NEGATIVE — SIGNIFICANT CHANGE UP
PH UR: 6 — SIGNIFICANT CHANGE UP (ref 5–8)
PROT UR-MCNC: 15
RBC CASTS # UR COMP ASSIST: NEGATIVE /HPF — SIGNIFICANT CHANGE UP (ref 0–4)
SP GR SPEC: 1.01 — SIGNIFICANT CHANGE UP (ref 1.01–1.02)
UROBILINOGEN FLD QL: 4 MG/DL
WBC UR QL: SIGNIFICANT CHANGE UP /HPF (ref 0–5)

## 2023-09-14 PROCEDURE — 99284 EMERGENCY DEPT VISIT MOD MDM: CPT

## 2023-09-14 PROCEDURE — 81001 URINALYSIS AUTO W/SCOPE: CPT

## 2023-09-14 PROCEDURE — 81025 URINE PREGNANCY TEST: CPT

## 2023-09-14 PROCEDURE — 99283 EMERGENCY DEPT VISIT LOW MDM: CPT

## 2023-09-14 PROCEDURE — 87086 URINE CULTURE/COLONY COUNT: CPT

## 2023-09-14 RX ORDER — NITROFURANTOIN MACROCRYSTAL 50 MG
1 CAPSULE ORAL
Qty: 13 | Refills: 0
Start: 2023-09-14 | End: 2023-09-20

## 2023-09-14 RX ORDER — NITROFURANTOIN MACROCRYSTAL 50 MG
100 CAPSULE ORAL ONCE
Refills: 0 | Status: DISCONTINUED | OUTPATIENT
Start: 2023-09-14 | End: 2023-09-21

## 2023-09-14 RX ORDER — LIDOCAINE 4 G/100G
1 CREAM TOPICAL ONCE
Refills: 0 | Status: DISCONTINUED | OUTPATIENT
Start: 2023-09-14 | End: 2023-09-21

## 2023-09-14 RX ORDER — ACETAMINOPHEN 500 MG
975 TABLET ORAL ONCE
Refills: 0 | Status: COMPLETED | OUTPATIENT
Start: 2023-09-14 | End: 2023-09-14

## 2023-09-14 RX ADMIN — Medication 975 MILLIGRAM(S): at 09:55

## 2023-09-14 NOTE — ED ADULT NURSE NOTE - NSFALLUNIVINTERV_ED_ALL_ED
Bed/Stretcher in lowest position, wheels locked, appropriate side rails in place/Call bell, personal items and telephone in reach/Instruct patient to call for assistance before getting out of bed/chair/stretcher/Non-slip footwear applied when patient is off stretcher/[place] to call system/Physically safe environment - no spills, clutter or unnecessary equipment/Purposeful proactive rounding/Room/bathroom lighting operational, light cord in reach

## 2023-09-14 NOTE — ED PROVIDER NOTE - ATTENDING APP SHARED VISIT CONTRIBUTION OF CARE
Pt with low back pain for 4 days, no back pain, no dysuria, no radiculopathy, no trauma pt with pos preg  reproducible spasm--lumabr b/l reproduces pain.  Discussed reduced option for pain control in light of pregnancy.  pt has appointment with ob/gyn and instructed to not take meds Rx by UC yesterday (uc had called and expressed this to patient as well)

## 2023-09-14 NOTE — ED PROVIDER NOTE - PATIENT PORTAL LINK FT
You can access the FollowMyHealth Patient Portal offered by [facility] by registering at the following website: [email or web address]. By joining [email or web address]’s FollowMyHealth portal, you will also be able to view your health information using other applications (apps) compatible with our system.

## 2023-09-14 NOTE — ED ADULT NURSE NOTE - OBJECTIVE STATEMENT
Assumed care of pt at 0841 in ST. Pt A&Ox4 c/o back pain. Pt at  yesterday who stated she had " faint " pregnancy pain. Pt denies CP and SOB. RR even and unlabored.

## 2023-09-14 NOTE — ED PROVIDER NOTE - PHYSICAL EXAMINATION
Gen: No acute distress, non toxic  Head: NCAT  Eyes: pink conjunctivae, EOMI, PERRL  CV: RRR, nl s1/s2.  Resp: CTAB, normal rate and effort  GI: Abdomen soft, NT, ND. No rebound, no guarding  : No CVAT  Neuro: A&O x 3, sensorimotor intact without deficits   MSK: Full ROM ext x 4. +TTP LS spine and diffusely across slower back. FWB and ambulatory with steady gait  Skin: No rashes. intact and perfused.

## 2023-09-14 NOTE — ED ADULT TRIAGE NOTE - CHIEF COMPLAINT QUOTE
Pt A&Ox4, NAD. Pt presents to the ED with complaints of lower back pain. Pt seen at  and had +pregnancy test. LMP 08/21/2023. Breathing even and unlabored.

## 2023-09-14 NOTE — ED PROVIDER NOTE - NS ED ROS FT
Gen: denies fever, chills, fatigue, weight loss  Skin: denies rashes, laceration, bruising  HEENT: denies visual changes, ear pain, nasal congestion, throat pain  Respiratory: denies JONES, SOB, cough, wheezing  Cardiovascular: denies chest pain, palpitations, diaphoresis, LE edema  MSK: +Low back pain. denies joint swelling/pain, neck pain  Neuro: denies headache, dizziness, weakness, numbness

## 2023-09-14 NOTE — ED PROVIDER NOTE - NSFOLLOWUPINSTRUCTIONS_ED_ALL_ED_FT
- Follow up with your doctor within 2-3 days.   - Return to the ED for any new or worsening symptoms included but not limited to weakness, urinary symptoms, numbness, difficulty walking, bowel/bladder incontinence, fevers, etc  - Avoid heavy lifting, significant exertion  - Apply heating pads/warm compresses to affected area while resting     Back Pain    Back pain is very common in adults. The cause of back pain is rarely dangerous and the pain often gets better over time. The cause of your back pain may not be known and may include strain of muscles or ligaments, degeneration of the spinal disks, or arthritis. Occasionally the pain may radiate down your leg(s). Over-the-counter medicines to reduce pain and inflammation are often the most helpful. Stretching and remaining active frequently helps the healing process.     SEEK IMMEDIATE MEDICAL CARE IF YOU HAVE ANY OF THE FOLLOWING SYMPTOMS: bowel or bladder control problems, unusual weakness or numbness in your arms or legs, nausea or vomiting, abdominal pain, fever, dizziness/lightheadedness. - Follow up with your doctor within 2-3 days.   - Return to the ED for any new or worsening symptoms included but not limited to weakness, urinary symptoms, numbness, difficulty walking, bowel/bladder incontinence, fevers, etc  - Avoid heavy lifting, significant exertion  - Apply heating pads/warm compresses to affected area while resting     Back Pain    Back pain is very common in adults. The cause of back pain is rarely dangerous and the pain often gets better over time. The cause of your back pain may not be known and may include strain of muscles or ligaments, degeneration of the spinal disks, or arthritis. Occasionally the pain may radiate down your leg(s). Over-the-counter medicines to reduce pain and inflammation are often the most helpful. Stretching and remaining active frequently helps the healing process.     SEEK IMMEDIATE MEDICAL CARE IF YOU HAVE ANY OF THE FOLLOWING SYMPTOMS: bowel or bladder control problems, unusual weakness or numbness in your arms or legs, nausea or vomiting, abdominal pain, fever, dizziness/lightheadedness.    - Follow-up with your OBGYN doctor for further prenatal care

## 2023-09-14 NOTE — ED PROVIDER NOTE - NS ED ATTENDING STATEMENT MOD
This was a shared visit with the SANTANA. I reviewed and verified the documentation and independently performed the documented:

## 2023-09-14 NOTE — ED PROVIDER NOTE - CLINICAL SUMMARY MEDICAL DECISION MAKING FREE TEXT BOX
37yo F presenting with lower back pain x 1 week. Pt well appearing, NAD. No red flags. no fall/trauma, no midline ttp or step offs. +Reproducible on exam. Neurologically intact and ambulatory in ED. questionable "faint" positive upreg at urgent care yesterday. will check UA, upreg and medicate for symptoms 39yo F presenting with lower back pain x 1 week. Pt well appearing, NAD. No red flags. no fall/trauma, no midline ttp or step offs. +Reproducible on exam. Neurologically intact and ambulatory in ED. questionable "faint" positive upreg at urgent care yesterday. will check UA, upreg and medicate for symptoms. +Upreg, pt given tylenol and lidocaine patch. tx for asymptomatic bacteruria with macrobid due to trace leuk esterase. advised to f/u obgyn

## 2023-09-14 NOTE — ED PROVIDER NOTE - OBJECTIVE STATEMENT
39yo F no PMHx presents to ED c/o low back pain x 1 week. Reporting non-radiating lower back pain, worse with movement and forward bending. Able to ambulate without difficulty. States she had been taking tylenol and motrin for pain control, however had persistent pain so went to urgent care yesterday and was given rx for steroid and muscle relaxer. States she did not take medications however because urgent care called her due to "faint" positive pregnancy test. Denies fever, chills, n/v/d, urinary sx, trauma/fall, heavy lifting, difficulty ambulating, bowel/bladder incontinence.

## 2023-09-16 LAB
CULTURE RESULTS: SIGNIFICANT CHANGE UP
SPECIMEN SOURCE: SIGNIFICANT CHANGE UP

## 2023-09-25 ENCOUNTER — EMERGENCY (EMERGENCY)
Facility: HOSPITAL | Age: 38
LOS: 1 days | Discharge: HOME | End: 2023-09-25
Attending: EMERGENCY MEDICINE
Payer: COMMERCIAL

## 2023-09-25 VITALS
HEART RATE: 97 BPM | OXYGEN SATURATION: 99 % | WEIGHT: 264.55 LBS | HEIGHT: 63 IN | RESPIRATION RATE: 20 BRPM | DIASTOLIC BLOOD PRESSURE: 97 MMHG | TEMPERATURE: 97 F | SYSTOLIC BLOOD PRESSURE: 159 MMHG

## 2023-09-25 LAB
ALBUMIN SERPL ELPH-MCNC: 4.3 G/DL — SIGNIFICANT CHANGE UP (ref 3.3–5.2)
ALP SERPL-CCNC: 219 U/L — HIGH (ref 40–120)
ALT FLD-CCNC: 49 U/L — HIGH
ANION GAP SERPL CALC-SCNC: 13 MMOL/L — SIGNIFICANT CHANGE UP (ref 5–17)
APPEARANCE UR: CLEAR — SIGNIFICANT CHANGE UP
AST SERPL-CCNC: 41 U/L — HIGH
BACTERIA # UR AUTO: ABNORMAL
BASOPHILS # BLD AUTO: 0.03 K/UL — SIGNIFICANT CHANGE UP (ref 0–0.2)
BASOPHILS NFR BLD AUTO: 0.3 % — SIGNIFICANT CHANGE UP (ref 0–2)
BILIRUB SERPL-MCNC: 0.7 MG/DL — SIGNIFICANT CHANGE UP (ref 0.4–2)
BILIRUB UR-MCNC: NEGATIVE — SIGNIFICANT CHANGE UP
BLD GP AB SCN SERPL QL: SIGNIFICANT CHANGE UP
BUN SERPL-MCNC: 10.1 MG/DL — SIGNIFICANT CHANGE UP (ref 8–20)
CALCIUM SERPL-MCNC: 9.3 MG/DL — SIGNIFICANT CHANGE UP (ref 8.4–10.5)
CHLORIDE SERPL-SCNC: 100 MMOL/L — SIGNIFICANT CHANGE UP (ref 96–108)
CO2 SERPL-SCNC: 23 MMOL/L — SIGNIFICANT CHANGE UP (ref 22–29)
COLOR SPEC: YELLOW — SIGNIFICANT CHANGE UP
CREAT SERPL-MCNC: 0.55 MG/DL — SIGNIFICANT CHANGE UP (ref 0.5–1.3)
DIFF PNL FLD: ABNORMAL
EGFR: 119 ML/MIN/1.73M2 — SIGNIFICANT CHANGE UP
EOSINOPHIL # BLD AUTO: 0.19 K/UL — SIGNIFICANT CHANGE UP (ref 0–0.5)
EOSINOPHIL NFR BLD AUTO: 2.2 % — SIGNIFICANT CHANGE UP (ref 0–6)
EPI CELLS # UR: SIGNIFICANT CHANGE UP
GLUCOSE SERPL-MCNC: 103 MG/DL — HIGH (ref 70–99)
GLUCOSE UR QL: NEGATIVE MG/DL — SIGNIFICANT CHANGE UP
HCG SERPL-ACNC: 4333 MIU/ML — HIGH
HCT VFR BLD CALC: 38.5 % — SIGNIFICANT CHANGE UP (ref 34.5–45)
HGB BLD-MCNC: 12.4 G/DL — SIGNIFICANT CHANGE UP (ref 11.5–15.5)
IMM GRANULOCYTES NFR BLD AUTO: 0.2 % — SIGNIFICANT CHANGE UP (ref 0–0.9)
KETONES UR-MCNC: ABNORMAL
LEUKOCYTE ESTERASE UR-ACNC: NEGATIVE — SIGNIFICANT CHANGE UP
LYMPHOCYTES # BLD AUTO: 2.64 K/UL — SIGNIFICANT CHANGE UP (ref 1–3.3)
LYMPHOCYTES # BLD AUTO: 30.2 % — SIGNIFICANT CHANGE UP (ref 13–44)
MCHC RBC-ENTMCNC: 26.4 PG — LOW (ref 27–34)
MCHC RBC-ENTMCNC: 32.2 GM/DL — SIGNIFICANT CHANGE UP (ref 32–36)
MCV RBC AUTO: 81.9 FL — SIGNIFICANT CHANGE UP (ref 80–100)
MONOCYTES # BLD AUTO: 0.77 K/UL — SIGNIFICANT CHANGE UP (ref 0–0.9)
MONOCYTES NFR BLD AUTO: 8.8 % — SIGNIFICANT CHANGE UP (ref 2–14)
NEUTROPHILS # BLD AUTO: 5.09 K/UL — SIGNIFICANT CHANGE UP (ref 1.8–7.4)
NEUTROPHILS NFR BLD AUTO: 58.3 % — SIGNIFICANT CHANGE UP (ref 43–77)
NITRITE UR-MCNC: NEGATIVE — SIGNIFICANT CHANGE UP
PH UR: 5 — SIGNIFICANT CHANGE UP (ref 5–8)
PLATELET # BLD AUTO: 457 K/UL — HIGH (ref 150–400)
POTASSIUM SERPL-MCNC: 4 MMOL/L — SIGNIFICANT CHANGE UP (ref 3.5–5.3)
POTASSIUM SERPL-SCNC: 4 MMOL/L — SIGNIFICANT CHANGE UP (ref 3.5–5.3)
PROT SERPL-MCNC: 7.7 G/DL — SIGNIFICANT CHANGE UP (ref 6.6–8.7)
PROT UR-MCNC: NEGATIVE — SIGNIFICANT CHANGE UP
RBC # BLD: 4.7 M/UL — SIGNIFICANT CHANGE UP (ref 3.8–5.2)
RBC # FLD: 17.6 % — HIGH (ref 10.3–14.5)
RBC CASTS # UR COMP ASSIST: SIGNIFICANT CHANGE UP /HPF (ref 0–4)
SODIUM SERPL-SCNC: 136 MMOL/L — SIGNIFICANT CHANGE UP (ref 135–145)
SP GR SPEC: 1.01 — SIGNIFICANT CHANGE UP (ref 1.01–1.02)
UROBILINOGEN FLD QL: 1 MG/DL
WBC # BLD: 8.74 K/UL — SIGNIFICANT CHANGE UP (ref 3.8–10.5)
WBC # FLD AUTO: 8.74 K/UL — SIGNIFICANT CHANGE UP (ref 3.8–10.5)
WBC UR QL: SIGNIFICANT CHANGE UP /HPF (ref 0–5)

## 2023-09-25 PROCEDURE — 76817 TRANSVAGINAL US OBSTETRIC: CPT | Mod: 26

## 2023-09-25 PROCEDURE — 76801 OB US < 14 WKS SINGLE FETUS: CPT | Mod: 26

## 2023-09-25 PROCEDURE — 99284 EMERGENCY DEPT VISIT MOD MDM: CPT

## 2023-09-25 NOTE — ED PROVIDER NOTE - NSFOLLOWUPINSTRUCTIONS_ED_ALL_ED_FT
- Prescription sent to pharmacy.  - Recommend repeat beta hCG in 48 hours. If you cannot be seen at [facility] or your OBGYN you may return to ED.   - Please bring all documentation from your ED visit to any related future follow up appointment.  - Please call to schedule follow up appointment with your primary care physician within 24-48 hours.  - Please seek immediate medical attention or return to the ED for any new/worsening, signs/symptoms, or concerns.    Feel better!     Vaginal Bleeding During Pregnancy, First Trimester       A small amount of bleeding from the vagina (spotting) is relatively common during early pregnancy. It usually stops on its own. Various things may cause bleeding or spotting during early pregnancy. Some bleeding may be related to the pregnancy, and some may not. In many cases, the bleeding is normal and is not a problem. However, bleeding can also be a sign of something serious. Be sure to tell your health care provider about any vaginal bleeding right away.    Some possible causes of vaginal bleeding during the first trimester include:    Infection or inflammation of the cervix.  Growths (polyps) on the cervix.  Miscarriage or threatened miscarriage.  Pregnancy tissue developing outside of the uterus (ectopic pregnancy).  A mass of tissue developing in the uterus due to an egg being fertilized incorrectly (molar pregnancy).    Follow these instructions at home:      Activity    Follow instructions from your health care provider about limiting your activity. Ask what activities are safe for you.  If needed, make plans for someone to help with your regular activities.  Do not have sex or orgasms until your health care provider says that this is safe.        General instructions    Take over-the-counter and prescription medicines only as told by your health care provider.  Pay attention to any changes in your symptoms.  Do not use tampons or douche.  Write down how many pads you use each day, how often you change pads, and how soaked (saturated) they are.  If you pass any tissue from your vagina, save the tissue so you can show it to your health care provider.  Keep all follow-up visits as told by your health care provider. This is important.    Contact a health care provider if:  You have vaginal bleeding during any part of your pregnancy.  You have cramps or labor pains.  You have a fever.    Get help right away if:  You have severe cramps in your back or abdomen.  You pass large clots or a large amount of tissue from your vagina.  Your bleeding increases.  You feel light-headed or weak, or you faint.  You have chills.  You are leaking fluid or have a gush of fluid from your vagina.    Summary  A small amount of bleeding (spotting) from the vagina is relatively common during early pregnancy.  Various things may cause bleeding or spotting in early pregnancy.  Be sure to tell your health care provider about any vaginal bleeding right away.    ADDITIONAL NOTES AND INSTRUCTIONS    Please follow up with your Primary MD in 24-48 hr.  Seek immediate medical care for any new/worsening signs or symptoms.

## 2023-09-25 NOTE — ED PROVIDER NOTE - ATTENDING CONTRIBUTION TO CARE
I have personally performed a history and physical examination of the patient and discussed management with the resident as well as the patient.  I reviewed the resident's note and agree with the documented findings and plan of care.  I have authored and modified critical sections of the Provider Note, including but not limited to HPI, Physical Exam and MDM.    37 y/o female presents for vaginal bleeding in pregnancy hx of multiple /ectopic in past p/w lower abdominal cramping and vaginal bleeding concerning for threatened AB vs ectopic vs vaginal bleeding in pregnancy, will obtain metabolic work-up with cbc, cmp UA, UC, hCG, TNS for RH status, TVUS.

## 2023-09-25 NOTE — ED PROVIDER NOTE - PHYSICAL EXAMINATION
GEN - NAD; well appearing; A&O x3   HEAD - NC/AT   EYES- PERRL, EOMI  ENT: Airway patent,  ABDOMEN - ND, NT, soft, no guarding, no rebound, no masses , no rigidity  : No CVA TTP, no suprapubic TTP, deferred pelvic for US  BACK - Normal  spine   EXTREMITIES - FROM, no edema,   SKIN - no rash or bruising   NEUROLOGIC - alert, speech clear, no focal deficits, normal gait, sensation grossly intact  PSYCH -nl mood/affect, nl insight.

## 2023-09-25 NOTE — ED PROVIDER NOTE - CLINICAL SUMMARY MEDICAL DECISION MAKING FREE TEXT BOX
39 y/o female presents for vaginal bleeding in pregnancy hx of multiple /ectopic in past p/w lower abdominal cramping and vaginal bleeding concerning for threatened AB vs ectopic vs vaginal bleeding in pregnancy, will obtain metabolic work-up with cbc, cmp UA, UC, hCG, TNS for RH status, US. 39 y/o female presents for vaginal bleeding in pregnancy hx of multiple /ectopic in past p/w lower abdominal cramping and vaginal bleeding concerning for threatened AB vs ectopic vs vaginal bleeding in pregnancy, will obtain metabolic work-up with cbc, cmp UA, UC, hCG, TNS for RH status, US.    SABINA Grimaldo [id number]: Received during sign out. Elevated hCG. Intrauterine sac without fetal pole. Advised repeat bHCG in 48 hours. Cephalexin for occasional bacteria in urine. Rhogam for Rh neg status. Medically stable for discharge.

## 2023-09-25 NOTE — ED ADULT TRIAGE NOTE - CHIEF COMPLAINT QUOTE
LMP 8/21, was told Im pregnant two weeks ago. lower abdominal cramping since yesterday with spotting today

## 2023-09-25 NOTE — ED ADULT NURSE NOTE - OBJECTIVE STATEMENT
Pt is a 38 year old female who states she had a positive pregnancy test 2 weeks ago. Pt then today had pink/bright red discharge and felt abdominal discomfort. this is pt 4th pregnancy, 1 living child 1 ectopic 1 miscarriage. Pt denies CP SOB difficulty urinating.

## 2023-09-25 NOTE — ED ADULT NURSE NOTE - NSFALLUNIVINTERV_ED_ALL_ED
Bed/Stretcher in lowest position, wheels locked, appropriate side rails in place/Call bell, personal items and telephone in reach/Instruct patient to call for assistance before getting out of bed/chair/stretcher/Non-slip footwear applied when patient is off stretcher/Only to call system/Physically safe environment - no spills, clutter or unnecessary equipment/Purposeful proactive rounding/Room/bathroom lighting operational, light cord in reach

## 2023-09-25 NOTE — ED PROVIDER NOTE - ATTENDING APP SHARED VISIT CONTRIBUTION OF CARE
I have personally performed a history and physical examination of the patient and discussed management with the SANTANA as well as the patient.  I reviewed the SANTANA's note and agree with the documented findings and plan of care.  I have authored and modified critical sections of the Provider Note, including but not limited to HPI, Physical Exam and MDM.    39 y/o female presents for vaginal bleeding in pregnancy hx of multiple /ectopic in past p/w lower abdominal cramping and vaginal bleeding concerning for threatened AB vs ectopic vs vaginal bleeding in pregnancy, will obtain metabolic work-up with cbc, cmp UA, UC, hCG, TNS for RH status, TVUS.

## 2023-09-25 NOTE — ED PROVIDER NOTE - NS ED ROS FT
CONSTITUTIONAL - no  fever, no diaphoresis, no weight change  SKIN - no rash  HEMATOLOGIC - no bleeding, no bruising  EYES - no eye pain, no blurred vision  ENT - no change in hearing, no pain  RESPIRATORY - no shortness of breath, no cough  CARDIAC - no chest pain, no palpitations  GI - + abd pain, no nausea, no vomiting, no diarrhea, no constipation, no bleeding  GENITO-URINARY - no discharge, no dysuria; no hematuria, + vaginal bleeding   ENDO - no polydipsia, no polyuria, no heat/no cold intolerance  MUSCULOSKELETAL - no joint pain, no swelling, no redness  NEUROLOGIC - no weakness, no headache, no anesthesia, no paresthesias  PSYCH - no anxiety, non suicidal, non homicidal, no hallucination, no depression

## 2023-09-25 NOTE — ED PROVIDER NOTE - NS ED ATTENDING STATEMENT MOD
This was a shared visit with the SANTANA. I reviewed and verified the documentation and independently performed the documented: Attending with

## 2023-09-25 NOTE — ED PROVIDER NOTE - PATIENT PORTAL LINK FT
You can access the FollowMyHealth Patient Portal offered by [facility] by registering at the following website: [email or web address]. By joining [facility]’s FollowMyHealth portal, you will also be able to view your health information using other applications (apps) compatible with our system.

## 2023-09-25 NOTE — ED PROVIDER NOTE - PROGRESS NOTE DETAILS
SABINA Grimaldo: Received during sign out. Elevated hCG. Intrauterine sac without fetal pole. All results reviewed with patient. Advised repeat bHCG in 48 hours. Cephalexin for occasional bacteria in urine. Rhogam for Rh neg status.

## 2023-09-25 NOTE — ED PROVIDER NOTE - OBJECTIVE STATEMENT
37 y/o female A2 with hx of ectopic and biochemical pregnancy  LMP 23 had OB appointment on 10/10 presents to the ED c/o lower abdominal cramping associated with vaginal spotting. Pt states cramping has been gradually worsening throughout the day. Pt has not had US for this pregnancy yet.

## 2023-09-26 VITALS — DIASTOLIC BLOOD PRESSURE: 83 MMHG | TEMPERATURE: 98 F | HEART RATE: 82 BPM | SYSTOLIC BLOOD PRESSURE: 125 MMHG

## 2023-09-26 DIAGNOSIS — Z11.1 ENCOUNTER FOR SCREENING FOR RESPIRATORY TUBERCULOSIS: ICD-10-CM

## 2023-09-26 LAB
CULTURE RESULTS: SIGNIFICANT CHANGE UP
SPECIMEN SOURCE: SIGNIFICANT CHANGE UP

## 2023-09-26 PROCEDURE — 99284 EMERGENCY DEPT VISIT MOD MDM: CPT

## 2023-09-26 PROCEDURE — 86900 BLOOD TYPING SEROLOGIC ABO: CPT

## 2023-09-26 PROCEDURE — 87086 URINE CULTURE/COLONY COUNT: CPT

## 2023-09-26 PROCEDURE — 76817 TRANSVAGINAL US OBSTETRIC: CPT

## 2023-09-26 PROCEDURE — 36415 COLL VENOUS BLD VENIPUNCTURE: CPT

## 2023-09-26 PROCEDURE — 76801 OB US < 14 WKS SINGLE FETUS: CPT

## 2023-09-26 PROCEDURE — 84702 CHORIONIC GONADOTROPIN TEST: CPT

## 2023-09-26 PROCEDURE — 81001 URINALYSIS AUTO W/SCOPE: CPT

## 2023-09-26 PROCEDURE — 85025 COMPLETE CBC W/AUTO DIFF WBC: CPT

## 2023-09-26 PROCEDURE — 86850 RBC ANTIBODY SCREEN: CPT

## 2023-09-26 PROCEDURE — 80053 COMPREHEN METABOLIC PANEL: CPT

## 2023-09-26 PROCEDURE — 86901 BLOOD TYPING SEROLOGIC RH(D): CPT

## 2023-09-26 RX ORDER — CEPHALEXIN 500 MG
500 CAPSULE ORAL ONCE
Refills: 0 | Status: COMPLETED | OUTPATIENT
Start: 2023-09-26 | End: 2023-09-26

## 2023-09-26 RX ORDER — CEPHALEXIN 500 MG
1 CAPSULE ORAL
Qty: 28 | Refills: 0
Start: 2023-09-26 | End: 2023-10-02

## 2023-09-26 RX ADMIN — Medication 500 MILLIGRAM(S): at 00:57

## 2023-09-29 ENCOUNTER — NON-APPOINTMENT (OUTPATIENT)
Age: 38
End: 2023-09-29

## 2023-09-29 LAB
M TB IFN-G BLD-IMP: NEGATIVE
QUANTIFERON TB PLUS MITOGEN MINUS NIL: 9.43 IU/ML
QUANTIFERON TB PLUS NIL: 0.1 IU/ML
QUANTIFERON TB PLUS TB1 MINUS NIL: -0.04 IU/ML
QUANTIFERON TB PLUS TB2 MINUS NIL: -0.02 IU/ML

## 2023-09-30 ENCOUNTER — LABORATORY RESULT (OUTPATIENT)
Age: 38
End: 2023-09-30

## 2023-10-02 ENCOUNTER — NON-APPOINTMENT (OUTPATIENT)
Age: 38
End: 2023-10-02

## 2023-11-12 ENCOUNTER — EMERGENCY (EMERGENCY)
Facility: HOSPITAL | Age: 38
LOS: 1 days | Discharge: HOME | End: 2023-11-12
Attending: STUDENT IN AN ORGANIZED HEALTH CARE EDUCATION/TRAINING PROGRAM
Payer: COMMERCIAL

## 2023-11-12 VITALS
SYSTOLIC BLOOD PRESSURE: 141 MMHG | HEIGHT: 63 IN | RESPIRATION RATE: 17 BRPM | OXYGEN SATURATION: 100 % | WEIGHT: 259.93 LBS | DIASTOLIC BLOOD PRESSURE: 91 MMHG | TEMPERATURE: 98 F | HEART RATE: 98 BPM

## 2023-11-12 PROCEDURE — 99284 EMERGENCY DEPT VISIT MOD MDM: CPT | Mod: 25

## 2023-11-12 NOTE — ED ADULT TRIAGE NOTE - CHIEF COMPLAINT QUOTE
Pt BIBA from home c/o vaginal bleeding tonight.  States she was having some random spotting since last night, but tonight had a gush of blood when getting up w/o clots.  Pt is 12 wks pregnant, , hx. of ectopic preg. 3 yrs ago.  Currently c/o mild cramping.

## 2023-11-12 NOTE — ED ADULT TRIAGE NOTE - WEIGHT METHOD
Patient notes she is interested in going to GI doctor due to skin/acne issue. She has seen dermatology for this already. Per patient, her sister also had similar problem and had Gluten/food sensitivity. Sister recommended to see GI specialist.     To Dr. Graham - please advise, patient looking for GI specialist recommendations   stated

## 2023-11-13 VITALS
SYSTOLIC BLOOD PRESSURE: 99 MMHG | HEART RATE: 80 BPM | OXYGEN SATURATION: 99 % | RESPIRATION RATE: 18 BRPM | TEMPERATURE: 98 F | DIASTOLIC BLOOD PRESSURE: 67 MMHG

## 2023-11-13 LAB
ALBUMIN SERPL ELPH-MCNC: 3.3 G/DL — SIGNIFICANT CHANGE UP (ref 3.3–5.2)
ALBUMIN SERPL ELPH-MCNC: 3.3 G/DL — SIGNIFICANT CHANGE UP (ref 3.3–5.2)
ALP SERPL-CCNC: 133 U/L — HIGH (ref 40–120)
ALP SERPL-CCNC: 133 U/L — HIGH (ref 40–120)
ALT FLD-CCNC: 16 U/L — SIGNIFICANT CHANGE UP
ALT FLD-CCNC: 16 U/L — SIGNIFICANT CHANGE UP
ANION GAP SERPL CALC-SCNC: 16 MMOL/L — SIGNIFICANT CHANGE UP (ref 5–17)
ANION GAP SERPL CALC-SCNC: 16 MMOL/L — SIGNIFICANT CHANGE UP (ref 5–17)
ANTIBODY INTERPRETATION 2: SIGNIFICANT CHANGE UP
APTT BLD: 22.6 SEC — LOW (ref 24.5–35.6)
APTT BLD: 22.6 SEC — LOW (ref 24.5–35.6)
AST SERPL-CCNC: 20 U/L — SIGNIFICANT CHANGE UP
AST SERPL-CCNC: 20 U/L — SIGNIFICANT CHANGE UP
BASOPHILS # BLD AUTO: 0.03 K/UL — SIGNIFICANT CHANGE UP (ref 0–0.2)
BASOPHILS # BLD AUTO: 0.03 K/UL — SIGNIFICANT CHANGE UP (ref 0–0.2)
BASOPHILS NFR BLD AUTO: 0.3 % — SIGNIFICANT CHANGE UP (ref 0–2)
BASOPHILS NFR BLD AUTO: 0.3 % — SIGNIFICANT CHANGE UP (ref 0–2)
BILIRUB SERPL-MCNC: 0.4 MG/DL — SIGNIFICANT CHANGE UP (ref 0.4–2)
BILIRUB SERPL-MCNC: 0.4 MG/DL — SIGNIFICANT CHANGE UP (ref 0.4–2)
BLD GP AB SCN SERPL QL: SIGNIFICANT CHANGE UP
BLD GP AB SCN SERPL QL: SIGNIFICANT CHANGE UP
BUN SERPL-MCNC: 9.9 MG/DL — SIGNIFICANT CHANGE UP (ref 8–20)
BUN SERPL-MCNC: 9.9 MG/DL — SIGNIFICANT CHANGE UP (ref 8–20)
CALCIUM SERPL-MCNC: 8.8 MG/DL — SIGNIFICANT CHANGE UP (ref 8.4–10.5)
CALCIUM SERPL-MCNC: 8.8 MG/DL — SIGNIFICANT CHANGE UP (ref 8.4–10.5)
CHLORIDE SERPL-SCNC: 101 MMOL/L — SIGNIFICANT CHANGE UP (ref 96–108)
CHLORIDE SERPL-SCNC: 101 MMOL/L — SIGNIFICANT CHANGE UP (ref 96–108)
CO2 SERPL-SCNC: 17 MMOL/L — LOW (ref 22–29)
CO2 SERPL-SCNC: 17 MMOL/L — LOW (ref 22–29)
CREAT SERPL-MCNC: 0.42 MG/DL — LOW (ref 0.5–1.3)
CREAT SERPL-MCNC: 0.42 MG/DL — LOW (ref 0.5–1.3)
DIR ANTIGLOB POLYSPECIFIC INTERPRETATION: SIGNIFICANT CHANGE UP
DIR ANTIGLOB POLYSPECIFIC INTERPRETATION: SIGNIFICANT CHANGE UP
EGFR: 127 ML/MIN/1.73M2 — SIGNIFICANT CHANGE UP
EGFR: 127 ML/MIN/1.73M2 — SIGNIFICANT CHANGE UP
EOSINOPHIL # BLD AUTO: 0.01 K/UL — SIGNIFICANT CHANGE UP (ref 0–0.5)
EOSINOPHIL # BLD AUTO: 0.01 K/UL — SIGNIFICANT CHANGE UP (ref 0–0.5)
EOSINOPHIL NFR BLD AUTO: 0.1 % — SIGNIFICANT CHANGE UP (ref 0–6)
EOSINOPHIL NFR BLD AUTO: 0.1 % — SIGNIFICANT CHANGE UP (ref 0–6)
GLUCOSE SERPL-MCNC: 76 MG/DL — SIGNIFICANT CHANGE UP (ref 70–99)
GLUCOSE SERPL-MCNC: 76 MG/DL — SIGNIFICANT CHANGE UP (ref 70–99)
HCG SERPL-ACNC: HIGH MIU/ML
HCG SERPL-ACNC: HIGH MIU/ML
HCT VFR BLD CALC: 34.2 % — LOW (ref 34.5–45)
HCT VFR BLD CALC: 34.2 % — LOW (ref 34.5–45)
HGB BLD-MCNC: 11.6 G/DL — SIGNIFICANT CHANGE UP (ref 11.5–15.5)
HGB BLD-MCNC: 11.6 G/DL — SIGNIFICANT CHANGE UP (ref 11.5–15.5)
IMM GRANULOCYTES NFR BLD AUTO: 0.3 % — SIGNIFICANT CHANGE UP (ref 0–0.9)
IMM GRANULOCYTES NFR BLD AUTO: 0.3 % — SIGNIFICANT CHANGE UP (ref 0–0.9)
INR BLD: 0.9 RATIO — SIGNIFICANT CHANGE UP (ref 0.85–1.18)
INR BLD: 0.9 RATIO — SIGNIFICANT CHANGE UP (ref 0.85–1.18)
LIDOCAIN IGE QN: 15 U/L — LOW (ref 22–51)
LIDOCAIN IGE QN: 15 U/L — LOW (ref 22–51)
LYMPHOCYTES # BLD AUTO: 2.36 K/UL — SIGNIFICANT CHANGE UP (ref 1–3.3)
LYMPHOCYTES # BLD AUTO: 2.36 K/UL — SIGNIFICANT CHANGE UP (ref 1–3.3)
LYMPHOCYTES # BLD AUTO: 23.5 % — SIGNIFICANT CHANGE UP (ref 13–44)
LYMPHOCYTES # BLD AUTO: 23.5 % — SIGNIFICANT CHANGE UP (ref 13–44)
MCHC RBC-ENTMCNC: 28.1 PG — SIGNIFICANT CHANGE UP (ref 27–34)
MCHC RBC-ENTMCNC: 28.1 PG — SIGNIFICANT CHANGE UP (ref 27–34)
MCHC RBC-ENTMCNC: 33.9 GM/DL — SIGNIFICANT CHANGE UP (ref 32–36)
MCHC RBC-ENTMCNC: 33.9 GM/DL — SIGNIFICANT CHANGE UP (ref 32–36)
MCV RBC AUTO: 82.8 FL — SIGNIFICANT CHANGE UP (ref 80–100)
MCV RBC AUTO: 82.8 FL — SIGNIFICANT CHANGE UP (ref 80–100)
MONOCYTES # BLD AUTO: 1.2 K/UL — HIGH (ref 0–0.9)
MONOCYTES # BLD AUTO: 1.2 K/UL — HIGH (ref 0–0.9)
MONOCYTES NFR BLD AUTO: 12 % — SIGNIFICANT CHANGE UP (ref 2–14)
MONOCYTES NFR BLD AUTO: 12 % — SIGNIFICANT CHANGE UP (ref 2–14)
NEUTROPHILS # BLD AUTO: 6.41 K/UL — SIGNIFICANT CHANGE UP (ref 1.8–7.4)
NEUTROPHILS # BLD AUTO: 6.41 K/UL — SIGNIFICANT CHANGE UP (ref 1.8–7.4)
NEUTROPHILS NFR BLD AUTO: 63.8 % — SIGNIFICANT CHANGE UP (ref 43–77)
NEUTROPHILS NFR BLD AUTO: 63.8 % — SIGNIFICANT CHANGE UP (ref 43–77)
PLATELET # BLD AUTO: 369 K/UL — SIGNIFICANT CHANGE UP (ref 150–400)
PLATELET # BLD AUTO: 369 K/UL — SIGNIFICANT CHANGE UP (ref 150–400)
POTASSIUM SERPL-MCNC: 4.1 MMOL/L — SIGNIFICANT CHANGE UP (ref 3.5–5.3)
POTASSIUM SERPL-MCNC: 4.1 MMOL/L — SIGNIFICANT CHANGE UP (ref 3.5–5.3)
POTASSIUM SERPL-SCNC: 4.1 MMOL/L — SIGNIFICANT CHANGE UP (ref 3.5–5.3)
POTASSIUM SERPL-SCNC: 4.1 MMOL/L — SIGNIFICANT CHANGE UP (ref 3.5–5.3)
PROT SERPL-MCNC: 6.8 G/DL — SIGNIFICANT CHANGE UP (ref 6.6–8.7)
PROT SERPL-MCNC: 6.8 G/DL — SIGNIFICANT CHANGE UP (ref 6.6–8.7)
PROTHROM AB SERPL-ACNC: 10 SEC — SIGNIFICANT CHANGE UP (ref 9.5–13)
PROTHROM AB SERPL-ACNC: 10 SEC — SIGNIFICANT CHANGE UP (ref 9.5–13)
RBC # BLD: 4.13 M/UL — SIGNIFICANT CHANGE UP (ref 3.8–5.2)
RBC # BLD: 4.13 M/UL — SIGNIFICANT CHANGE UP (ref 3.8–5.2)
RBC # FLD: 13.9 % — SIGNIFICANT CHANGE UP (ref 10.3–14.5)
RBC # FLD: 13.9 % — SIGNIFICANT CHANGE UP (ref 10.3–14.5)
SODIUM SERPL-SCNC: 134 MMOL/L — LOW (ref 135–145)
SODIUM SERPL-SCNC: 134 MMOL/L — LOW (ref 135–145)
WBC # BLD: 10.04 K/UL — SIGNIFICANT CHANGE UP (ref 3.8–10.5)
WBC # BLD: 10.04 K/UL — SIGNIFICANT CHANGE UP (ref 3.8–10.5)
WBC # FLD AUTO: 10.04 K/UL — SIGNIFICANT CHANGE UP (ref 3.8–10.5)
WBC # FLD AUTO: 10.04 K/UL — SIGNIFICANT CHANGE UP (ref 3.8–10.5)

## 2023-11-13 PROCEDURE — 85025 COMPLETE CBC W/AUTO DIFF WBC: CPT

## 2023-11-13 PROCEDURE — 76801 OB US < 14 WKS SINGLE FETUS: CPT | Mod: 26

## 2023-11-13 PROCEDURE — 86901 BLOOD TYPING SEROLOGIC RH(D): CPT

## 2023-11-13 PROCEDURE — 85730 THROMBOPLASTIN TIME PARTIAL: CPT

## 2023-11-13 PROCEDURE — 76801 OB US < 14 WKS SINGLE FETUS: CPT

## 2023-11-13 PROCEDURE — 86870 RBC ANTIBODY IDENTIFICATION: CPT

## 2023-11-13 PROCEDURE — 85610 PROTHROMBIN TIME: CPT

## 2023-11-13 PROCEDURE — 86077 PHYS BLOOD BANK SERV XMATCH: CPT

## 2023-11-13 PROCEDURE — 99284 EMERGENCY DEPT VISIT MOD MDM: CPT | Mod: 25

## 2023-11-13 PROCEDURE — 96372 THER/PROPH/DIAG INJ SC/IM: CPT | Mod: XU

## 2023-11-13 PROCEDURE — 80053 COMPREHEN METABOLIC PANEL: CPT

## 2023-11-13 PROCEDURE — 86900 BLOOD TYPING SEROLOGIC ABO: CPT

## 2023-11-13 PROCEDURE — 84702 CHORIONIC GONADOTROPIN TEST: CPT

## 2023-11-13 PROCEDURE — 86880 COOMBS TEST DIRECT: CPT

## 2023-11-13 PROCEDURE — 86850 RBC ANTIBODY SCREEN: CPT

## 2023-11-13 PROCEDURE — 36415 COLL VENOUS BLD VENIPUNCTURE: CPT

## 2023-11-13 PROCEDURE — 83690 ASSAY OF LIPASE: CPT

## 2023-11-13 PROCEDURE — 96374 THER/PROPH/DIAG INJ IV PUSH: CPT

## 2023-11-13 RX ORDER — ACETAMINOPHEN 500 MG
1000 TABLET ORAL ONCE
Refills: 0 | Status: COMPLETED | OUTPATIENT
Start: 2023-11-13 | End: 2023-11-13

## 2023-11-13 RX ORDER — SODIUM CHLORIDE 9 MG/ML
1000 INJECTION INTRAMUSCULAR; INTRAVENOUS; SUBCUTANEOUS ONCE
Refills: 0 | Status: COMPLETED | OUTPATIENT
Start: 2023-11-13 | End: 2023-11-13

## 2023-11-13 RX ADMIN — Medication 400 MILLIGRAM(S): at 00:58

## 2023-11-13 RX ADMIN — SODIUM CHLORIDE 1000 MILLILITER(S): 9 INJECTION INTRAMUSCULAR; INTRAVENOUS; SUBCUTANEOUS at 00:58

## 2023-11-13 NOTE — ED PROVIDER NOTE - PROGRESS NOTE DETAILS
labs WNL, US with normal IUP. All results discussed with the patient, and a copy of results has been provided. Patient is comfortable with dc plan for home. Opportunity for questions was provided and all questions have been addressed. Patient understands when to return to ED if symptoms worsen.   Amy hylton

## 2023-11-13 NOTE — ED ADULT NURSE NOTE - NSFALLUNIVINTERV_ED_ALL_ED
Bed/Stretcher in lowest position, wheels locked, appropriate side rails in place/Call bell, personal items and telephone in reach/Instruct patient to call for assistance before getting out of bed/chair/stretcher/Non-slip footwear applied when patient is off stretcher/[place] to call system/Physically safe environment - no spills, clutter or unnecessary equipment/Purposeful proactive rounding/Room/bathroom lighting operational, light cord in reach

## 2023-11-13 NOTE — ED PROVIDER NOTE - OBJECTIVE STATEMENT
38 year old female [id number] with no med hx presented to the ED c/o vaginal bleeding in pregnancy. pt states she is 12 weeks pregnant, LMP . has followed with OBGYN Dr. dominguez from [place]. last US when she was 7 weeks pregnant, last appointment was friday. today had dark vaginal spotting progressed into gush of red blood with clotts. also admits to abd cramping. denies nausea, vomiting, fever/chills, dysuria.

## 2023-11-13 NOTE — ED PROVIDER NOTE - CLINICAL SUMMARY MEDICAL DECISION MAKING FREE TEXT BOX
38 year old female [id number] with no med hx presented to the ED c/o vaginal bleeding in pregnancy. r/o miscarriage with U/S blood work labs

## 2023-11-13 NOTE — ED PROVIDER NOTE - PATIENT PORTAL LINK FT
You can access the FollowMyHealth Patient Portal offered by [facility] by registering at the following website: [email or web address]. By joining [facility]’s FollowMyHealth portal, you will also be able to view your health information using other applications (apps) compatible with our system.

## 2023-11-13 NOTE — ED PROVIDER NOTE - NSFOLLOWUPINSTRUCTIONS_ED_ALL_ED_FT
Follow up with OBGYN within 1 week   Follow up with PCP within 1-2 days   take tylenol for pain every 6 hours     Return if new or worsening symptoms       SEEK IMMEDIATE MEDICAL CARE IF YOU HAVE ANY OF THE FOLLOWING SYMPTOMS: heavy vaginal bleeding, severe low back or abdominal cramps, fever/chills, lightheadedness/dizziness, or fainting.

## 2024-06-02 ENCOUNTER — TRANSCRIPTION ENCOUNTER (OUTPATIENT)
Age: 39
End: 2024-06-02

## 2024-07-16 NOTE — ASU PREOP CHECKLIST - VERIFY SURGICAL SITE/SIDE WITH PATIENT
Assessment/Plan   Diagnoses and all orders for this visit:  Mechanical complication due to intraocular lens implant, initial encounter  Optic capture OD s/p PPV most likely due to high IOP from gas bubble  Discussed with pt and her family at length different options for IOL repositioning/possible exchange for a sulcus 3 piece  Discussed possible risks involved including PC tear, IOL dislocation, subluxation, inability to release the adhesions  Discussed also possibility of uveitis glaucoma hyphema syndrome (UGH) syndrome postop  Explained vision will be limited by retina status (s/p PPV)  They all understand and wish to proceed     done

## 2024-08-30 DIAGNOSIS — B37.31 ACUTE CANDIDIASIS OF VULVA AND VAGINA: ICD-10-CM

## 2024-08-30 DIAGNOSIS — B49 UNSPECIFIED MYCOSIS: ICD-10-CM

## 2024-08-30 RX ORDER — FLUCONAZOLE 150 MG/1
150 TABLET ORAL
Qty: 1 | Refills: 3 | Status: ACTIVE | COMMUNITY
Start: 2024-08-30 | End: 1900-01-01

## 2024-10-03 ENCOUNTER — APPOINTMENT (OUTPATIENT)
Dept: INTERNAL MEDICINE | Facility: CLINIC | Age: 39
End: 2024-10-03

## 2024-11-09 NOTE — ED PROVIDER NOTE - PRO INTERPRETER NEED 2
I have prescribed an antibiotic for you to take twice a day for 1 week.  You can take the cough medicine I prescribed (benzonatate) up to 3 times a day as needed for cough.  You can also try other over-the-counter cough medications such as Robitussin or Delsym in addition to tea, honey, cough drops, and saltwater gargles.  For congestion, you can try an over-the-counter nasal saline spray or an over-the-counter decongestant spray called Afrin (Afrin can be used for no more than 3 days at a time).  If you notice a lot of phlegm in the back of your throat, you can try over-the-counter Mucinex (guaifenesin).  You can take Tylenol and/or ibuprofen as needed for any pain, fevers, headaches, etc.  Please return to our clinic or follow-up with your doctor if you notice any worsening symptoms.   English

## 2025-02-06 ENCOUNTER — TRANSCRIPTION ENCOUNTER (OUTPATIENT)
Age: 40
End: 2025-02-06

## 2025-06-06 ENCOUNTER — APPOINTMENT (OUTPATIENT)
Dept: INTERNAL MEDICINE | Facility: CLINIC | Age: 40
End: 2025-06-06
Payer: MEDICAID

## 2025-06-06 ENCOUNTER — NON-APPOINTMENT (OUTPATIENT)
Age: 40
End: 2025-06-06

## 2025-06-06 VITALS
DIASTOLIC BLOOD PRESSURE: 83 MMHG | SYSTOLIC BLOOD PRESSURE: 129 MMHG | HEIGHT: 62 IN | BODY MASS INDEX: 53.55 KG/M2 | HEART RATE: 88 BPM | WEIGHT: 291 LBS | RESPIRATION RATE: 16 BRPM

## 2025-06-06 PROCEDURE — 99214 OFFICE O/P EST MOD 30 MIN: CPT | Mod: 25

## 2025-06-06 PROCEDURE — 93000 ELECTROCARDIOGRAM COMPLETE: CPT

## 2025-06-06 PROCEDURE — 99396 PREV VISIT EST AGE 40-64: CPT | Mod: 25

## 2025-06-06 RX ORDER — LEVOFLOXACIN 500 MG/1
500 TABLET, FILM COATED ORAL DAILY
Qty: 14 | Refills: 0 | Status: ACTIVE | COMMUNITY
Start: 2025-06-06 | End: 1900-01-01

## 2025-06-07 LAB
ALBUMIN SERPL ELPH-MCNC: 3.3 G/DL
ALP BLD-CCNC: 424 U/L
ALT SERPL-CCNC: 95 U/L
ANION GAP SERPL CALC-SCNC: 12 MMOL/L
AST SERPL-CCNC: 70 U/L
BASOPHILS # BLD AUTO: 0.04 K/UL
BASOPHILS NFR BLD AUTO: 0.6 %
BILIRUB SERPL-MCNC: 0.4 MG/DL
BUN SERPL-MCNC: 15 MG/DL
CALCIUM SERPL-MCNC: 9.4 MG/DL
CHLORIDE SERPL-SCNC: 100 MMOL/L
CHOLEST SERPL-MCNC: 163 MG/DL
CO2 SERPL-SCNC: 25 MMOL/L
CREAT SERPL-MCNC: 0.61 MG/DL
EGFRCR SERPLBLD CKD-EPI 2021: 114 ML/MIN/1.73M2
EOSINOPHIL # BLD AUTO: 0.12 K/UL
EOSINOPHIL NFR BLD AUTO: 1.8 %
ESTIMATED AVERAGE GLUCOSE: 100 MG/DL
GLUCOSE SERPL-MCNC: 83 MG/DL
HBA1C MFR BLD HPLC: 5.1 %
HCT VFR BLD CALC: 37.1 %
HCV AB SER QL: NONREACTIVE
HCV S/CO RATIO: 0.11 S/CO
HDLC SERPL-MCNC: 47 MG/DL
HGB BLD-MCNC: 10.9 G/DL
IMM GRANULOCYTES NFR BLD AUTO: 0.7 %
LDLC SERPL-MCNC: 95 MG/DL
LYMPHOCYTES # BLD AUTO: 2.56 K/UL
LYMPHOCYTES NFR BLD AUTO: 38.3 %
MAN DIFF?: NORMAL
MCHC RBC-ENTMCNC: 25.8 PG
MCHC RBC-ENTMCNC: 29.4 G/DL
MCV RBC AUTO: 87.7 FL
MONOCYTES # BLD AUTO: 1.06 K/UL
MONOCYTES NFR BLD AUTO: 15.9 %
NEUTROPHILS # BLD AUTO: 2.85 K/UL
NEUTROPHILS NFR BLD AUTO: 42.7 %
NONHDLC SERPL-MCNC: 116 MG/DL
PLATELET # BLD AUTO: 672 K/UL
POTASSIUM SERPL-SCNC: 4.9 MMOL/L
PROT SERPL-MCNC: 6.1 G/DL
RBC # BLD: 4.23 M/UL
RBC # FLD: 19.7 %
SODIUM SERPL-SCNC: 137 MMOL/L
T4 FREE SERPL-MCNC: 1.5 NG/DL
TRIGL SERPL-MCNC: 120 MG/DL
TSH SERPL-ACNC: 3.06 UIU/ML
WBC # FLD AUTO: 6.68 K/UL

## 2025-06-08 LAB — HIV1+2 AB SPEC QL IA.RAPID: NONREACTIVE

## 2025-06-09 PROBLEM — R74.8 ELEVATED LIVER ENZYMES: Status: ACTIVE | Noted: 2025-06-09

## 2025-06-12 ENCOUNTER — OUTPATIENT (OUTPATIENT)
Dept: OUTPATIENT SERVICES | Facility: HOSPITAL | Age: 40
LOS: 1 days | End: 2025-06-12
Payer: MEDICAID

## 2025-06-12 DIAGNOSIS — S41.109A UNSPECIFIED OPEN WOUND OF UNSPECIFIED UPPER ARM, INITIAL ENCOUNTER: ICD-10-CM

## 2025-06-12 PROCEDURE — 99202 OFFICE O/P NEW SF 15 MIN: CPT

## 2025-06-17 DIAGNOSIS — Z92.21 PERSONAL HISTORY OF ANTINEOPLASTIC CHEMOTHERAPY: ICD-10-CM

## 2025-06-17 DIAGNOSIS — T81.31XA DISRUPTION OF EXTERNAL OPERATION (SURGICAL) WOUND, NOT ELSEWHERE CLASSIFIED, INITIAL ENCOUNTER: ICD-10-CM

## 2025-06-17 DIAGNOSIS — Z92.3 PERSONAL HISTORY OF IRRADIATION: ICD-10-CM
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
## 2025-06-19 ENCOUNTER — OUTPATIENT (OUTPATIENT)
Dept: OUTPATIENT SERVICES | Facility: HOSPITAL | Age: 40
LOS: 1 days | End: 2025-06-19
Payer: MEDICAID

## 2025-06-19 DIAGNOSIS — S41.109A UNSPECIFIED OPEN WOUND OF UNSPECIFIED UPPER ARM, INITIAL ENCOUNTER: ICD-10-CM

## 2025-06-19 PROCEDURE — 99213 OFFICE O/P EST LOW 20 MIN: CPT

## 2025-06-20 DIAGNOSIS — Z92.21 PERSONAL HISTORY OF ANTINEOPLASTIC CHEMOTHERAPY: ICD-10-CM
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
## 2025-06-20 DIAGNOSIS — T81.31XA DISRUPTION OF EXTERNAL OPERATION (SURGICAL) WOUND, NOT ELSEWHERE CLASSIFIED, INITIAL ENCOUNTER: ICD-10-CM

## 2025-06-20 DIAGNOSIS — Z85.41 PERSONAL HISTORY OF MALIGNANT NEOPLASM OF CERVIX UTERI: ICD-10-CM
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
## 2025-06-26 ENCOUNTER — OUTPATIENT (OUTPATIENT)
Dept: OUTPATIENT SERVICES | Facility: HOSPITAL | Age: 40
LOS: 1 days | End: 2025-06-26

## 2025-06-26 DIAGNOSIS — S41.109A UNSPECIFIED OPEN WOUND OF UNSPECIFIED UPPER ARM, INITIAL ENCOUNTER: ICD-10-CM

## 2025-06-26 PROCEDURE — 99213 OFFICE O/P EST LOW 20 MIN: CPT

## 2025-07-01 DIAGNOSIS — Z85.41 PERSONAL HISTORY OF MALIGNANT NEOPLASM OF CERVIX UTERI: ICD-10-CM
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
## 2025-07-01 DIAGNOSIS — Z92.21 PERSONAL HISTORY OF ANTINEOPLASTIC CHEMOTHERAPY: ICD-10-CM

## 2025-07-01 DIAGNOSIS — T81.31XA DISRUPTION OF EXTERNAL OPERATION (SURGICAL) WOUND, NOT ELSEWHERE CLASSIFIED, INITIAL ENCOUNTER: ICD-10-CM

## 2025-07-01 DIAGNOSIS — Z92.3 PERSONAL HISTORY OF IRRADIATION: ICD-10-CM
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
## 2025-09-03 ENCOUNTER — APPOINTMENT (OUTPATIENT)
Dept: INTERNAL MEDICINE | Facility: CLINIC | Age: 40
End: 2025-09-03
Payer: MEDICAID

## 2025-09-03 VITALS
SYSTOLIC BLOOD PRESSURE: 132 MMHG | HEART RATE: 77 BPM | RESPIRATION RATE: 17 BRPM | DIASTOLIC BLOOD PRESSURE: 79 MMHG | WEIGHT: 290 LBS | HEIGHT: 62 IN | BODY MASS INDEX: 53.37 KG/M2

## 2025-09-03 DIAGNOSIS — E66.01 MORBID (SEVERE) OBESITY DUE TO EXCESS CALORIES: ICD-10-CM

## 2025-09-03 DIAGNOSIS — R20.2 PARESTHESIA OF SKIN: ICD-10-CM

## 2025-09-03 DIAGNOSIS — M79.2 NEURALGIA AND NEURITIS, UNSPECIFIED: ICD-10-CM

## 2025-09-03 PROCEDURE — G2211 COMPLEX E/M VISIT ADD ON: CPT | Mod: NC

## 2025-09-03 PROCEDURE — 99214 OFFICE O/P EST MOD 30 MIN: CPT

## 2025-09-03 RX ORDER — GABAPENTIN 100 MG/1
100 CAPSULE ORAL
Qty: 30 | Refills: 5 | Status: ACTIVE | COMMUNITY
Start: 2025-09-03 | End: 1900-01-01